# Patient Record
Sex: FEMALE | Race: WHITE | NOT HISPANIC OR LATINO | Employment: FULL TIME | URBAN - METROPOLITAN AREA
[De-identification: names, ages, dates, MRNs, and addresses within clinical notes are randomized per-mention and may not be internally consistent; named-entity substitution may affect disease eponyms.]

---

## 2023-03-03 LAB
ALANINE AMINOTRANSFERASE (SGPT) (U/L) IN SER/PLAS: 38 U/L (ref 7–45)
ALBUMIN (G/DL) IN SER/PLAS: 4.1 G/DL (ref 3.4–5)
ALKALINE PHOSPHATASE (U/L) IN SER/PLAS: 129 U/L (ref 33–136)
ANION GAP IN SER/PLAS: 15 MMOL/L (ref 10–20)
ASPARTATE AMINOTRANSFERASE (SGOT) (U/L) IN SER/PLAS: 16 U/L (ref 9–39)
BASOPHILS (10*3/UL) IN BLOOD BY AUTOMATED COUNT: 0.12 X10E9/L (ref 0–0.1)
BASOPHILS/100 LEUKOCYTES IN BLOOD BY AUTOMATED COUNT: 1.1 % (ref 0–2)
BILIRUBIN TOTAL (MG/DL) IN SER/PLAS: 0.4 MG/DL (ref 0–1.2)
CALCIUM (MG/DL) IN SER/PLAS: 9.1 MG/DL (ref 8.6–10.3)
CARBON DIOXIDE, TOTAL (MMOL/L) IN SER/PLAS: 26 MMOL/L (ref 21–32)
CHLORIDE (MMOL/L) IN SER/PLAS: 102 MMOL/L (ref 98–107)
CREATININE (MG/DL) IN SER/PLAS: 0.87 MG/DL (ref 0.5–1.05)
EOSINOPHILS (10*3/UL) IN BLOOD BY AUTOMATED COUNT: 0.15 X10E9/L (ref 0–0.7)
EOSINOPHILS/100 LEUKOCYTES IN BLOOD BY AUTOMATED COUNT: 1.3 % (ref 0–6)
ERYTHROCYTE DISTRIBUTION WIDTH (RATIO) BY AUTOMATED COUNT: 14.5 % (ref 11.5–14.5)
ERYTHROCYTE MEAN CORPUSCULAR HEMOGLOBIN CONCENTRATION (G/DL) BY AUTOMATED: 31.3 G/DL (ref 32–36)
ERYTHROCYTE MEAN CORPUSCULAR VOLUME (FL) BY AUTOMATED COUNT: 94 FL (ref 80–100)
ERYTHROCYTES (10*6/UL) IN BLOOD BY AUTOMATED COUNT: 4.27 X10E12/L (ref 4–5.2)
GFR FEMALE: 75 ML/MIN/1.73M2
GLUCOSE (MG/DL) IN SER/PLAS: 92 MG/DL (ref 74–99)
HEMATOCRIT (%) IN BLOOD BY AUTOMATED COUNT: 40 % (ref 36–46)
HEMOGLOBIN (G/DL) IN BLOOD: 12.5 G/DL (ref 12–16)
IMMATURE GRANULOCYTES/100 LEUKOCYTES IN BLOOD BY AUTOMATED COUNT: 0.5 % (ref 0–0.9)
LEUKOCYTES (10*3/UL) IN BLOOD BY AUTOMATED COUNT: 11.2 X10E9/L (ref 4.4–11.3)
LIPASE (U/L) IN SER/PLAS: 95 U/L (ref 9–82)
LYMPHOCYTES (10*3/UL) IN BLOOD BY AUTOMATED COUNT: 2.55 X10E9/L (ref 1.2–4.8)
LYMPHOCYTES/100 LEUKOCYTES IN BLOOD BY AUTOMATED COUNT: 22.8 % (ref 13–44)
MONOCYTES (10*3/UL) IN BLOOD BY AUTOMATED COUNT: 1.04 X10E9/L (ref 0.1–1)
MONOCYTES/100 LEUKOCYTES IN BLOOD BY AUTOMATED COUNT: 9.3 % (ref 2–10)
NEUTROPHILS (10*3/UL) IN BLOOD BY AUTOMATED COUNT: 7.26 X10E9/L (ref 1.2–7.7)
NEUTROPHILS/100 LEUKOCYTES IN BLOOD BY AUTOMATED COUNT: 65 % (ref 40–80)
PLATELETS (10*3/UL) IN BLOOD AUTOMATED COUNT: 511 X10E9/L (ref 150–450)
POTASSIUM (MMOL/L) IN SER/PLAS: 4.1 MMOL/L (ref 3.5–5.3)
PROTEIN TOTAL: 7.9 G/DL (ref 6.4–8.2)
SODIUM (MMOL/L) IN SER/PLAS: 139 MMOL/L (ref 136–145)
UREA NITROGEN (MG/DL) IN SER/PLAS: 21 MG/DL (ref 6–23)

## 2023-03-04 ENCOUNTER — TELEPHONE (OUTPATIENT)
Dept: PRIMARY CARE | Facility: CLINIC | Age: 63
End: 2023-03-04
Payer: COMMERCIAL

## 2023-03-04 DIAGNOSIS — M79.2 NEUROPATHIC PAIN OF UPPER EXTREMITY: ICD-10-CM

## 2023-03-04 DIAGNOSIS — M77.8 TENDONITIS OF BOTH ELBOWS: ICD-10-CM

## 2023-03-04 DIAGNOSIS — R19.7 DIARRHEA, UNSPECIFIED TYPE: Primary | ICD-10-CM

## 2023-03-04 PROBLEM — R39.11 URINARY HESITANCY: Status: RESOLVED | Noted: 2023-03-04 | Resolved: 2023-03-04

## 2023-03-04 PROBLEM — E83.52 HYPERCALCEMIA: Status: RESOLVED | Noted: 2023-03-04 | Resolved: 2023-03-04

## 2023-03-04 PROBLEM — M10.9 GOUT ATTACK: Status: RESOLVED | Noted: 2023-03-04 | Resolved: 2023-03-04

## 2023-03-04 PROBLEM — E55.9 VITAMIN D DEFICIENCY: Status: ACTIVE | Noted: 2023-03-04

## 2023-03-04 PROBLEM — D50.9 IRON DEFICIENCY ANEMIA: Status: ACTIVE | Noted: 2023-03-04

## 2023-03-04 PROBLEM — N81.10 CYSTOCELE WITH RECTOCELE: Status: RESOLVED | Noted: 2023-03-04 | Resolved: 2023-03-04

## 2023-03-04 PROBLEM — M54.50 LOW BACK PAIN: Status: RESOLVED | Noted: 2023-03-04 | Resolved: 2023-03-04

## 2023-03-04 PROBLEM — N99.3 VAGINAL VAULT PROLAPSE, POSTHYSTERECTOMY: Status: RESOLVED | Noted: 2023-03-04 | Resolved: 2023-03-04

## 2023-03-04 PROBLEM — N81.89 PELVIC FLOOR WEAKNESS: Status: RESOLVED | Noted: 2023-03-04 | Resolved: 2023-03-04

## 2023-03-04 PROBLEM — D75.839 THROMBOCYTHEMIA: Status: ACTIVE | Noted: 2023-03-04

## 2023-03-04 PROBLEM — I82.622: Status: RESOLVED | Noted: 2023-03-04 | Resolved: 2023-03-04

## 2023-03-04 PROBLEM — F51.04 CHRONIC INSOMNIA: Status: ACTIVE | Noted: 2023-03-04

## 2023-03-04 PROBLEM — N95.2 POST-MENOPAUSE ATROPHIC VAGINITIS: Status: RESOLVED | Noted: 2023-03-04 | Resolved: 2023-03-04

## 2023-03-04 PROBLEM — N39.3 FEMALE STRESS INCONTINENCE: Status: ACTIVE | Noted: 2023-03-04

## 2023-03-04 PROBLEM — N81.6 CYSTOCELE WITH RECTOCELE: Status: RESOLVED | Noted: 2023-03-04 | Resolved: 2023-03-04

## 2023-03-04 PROBLEM — E03.9 HYPOTHYROIDISM, ADULT: Status: ACTIVE | Noted: 2023-03-04

## 2023-03-04 PROBLEM — K21.9 GERD (GASTROESOPHAGEAL REFLUX DISEASE): Status: ACTIVE | Noted: 2023-03-04

## 2023-03-04 PROBLEM — M25.50 POLYARTHRALGIA: Status: ACTIVE | Noted: 2023-03-04

## 2023-03-04 PROBLEM — M79.7 PRIMARY FIBROMYALGIA: Status: ACTIVE | Noted: 2023-03-04

## 2023-03-04 PROBLEM — M54.2 NECK PAIN: Status: RESOLVED | Noted: 2023-03-04 | Resolved: 2023-03-04

## 2023-03-04 PROBLEM — I48.91 ATRIAL FIBRILLATION WITH RVR (MULTI): Status: RESOLVED | Noted: 2023-03-04 | Resolved: 2023-03-04

## 2023-03-04 RX ORDER — ERGOCALCIFEROL 1.25 MG/1
1 CAPSULE ORAL
COMMUNITY
Start: 2020-03-20

## 2023-03-04 RX ORDER — PANTOPRAZOLE SODIUM 40 MG/1
40 TABLET, DELAYED RELEASE ORAL 2 TIMES DAILY
COMMUNITY
Start: 2022-07-21 | End: 2023-09-06 | Stop reason: SDUPTHER

## 2023-03-04 RX ORDER — ZOLPIDEM TARTRATE 10 MG/1
1 TABLET ORAL NIGHTLY PRN
COMMUNITY
End: 2023-06-27

## 2023-03-04 RX ORDER — LEVOTHYROXINE SODIUM 100 UG/1
1 TABLET ORAL DAILY
COMMUNITY
End: 2023-09-06 | Stop reason: SDUPTHER

## 2023-03-06 RX ORDER — PREDNISONE 10 MG/1
TABLET ORAL
Qty: 70 TABLET | Refills: 0 | Status: SHIPPED | OUTPATIENT
Start: 2023-03-06 | End: 2023-04-03

## 2023-03-06 NOTE — TELEPHONE ENCOUNTER
Spoke with patient. She is feeling much better over the past 2 days. Instructed to finish Abx for presumed pylonephritis.  Discussed referral to GI for chronic loose stool. Referral entered.  Discussed starting prednisone taper once she is off Abx, for the bilateral neuropathic arm pain. Rx sent to pharmacy.    Patient to call if symptoms start to worsen.  Virginia Factor, DO

## 2023-03-29 ENCOUNTER — TELEPHONE (OUTPATIENT)
Dept: PRIMARY CARE | Facility: CLINIC | Age: 63
End: 2023-03-29
Payer: COMMERCIAL

## 2023-03-29 DIAGNOSIS — F40.240 CLAUSTROPHOBIA: Primary | ICD-10-CM

## 2023-03-29 RX ORDER — LORAZEPAM 0.5 MG/1
.5-1 TABLET ORAL SEE ADMIN INSTRUCTIONS
Qty: 2 TABLET | Refills: 0 | Status: SHIPPED | OUTPATIENT
Start: 2023-03-29 | End: 2023-09-06 | Stop reason: SDUPTHER

## 2023-03-29 NOTE — TELEPHONE ENCOUNTER
Pt called in requesting, something to be called in. Pt is having MRI on Friday and she claustrophobic.     Pharmacy - [facility]

## 2023-04-27 LAB
THYROTROPIN (MIU/L) IN SER/PLAS BY DETECTION LIMIT <= 0.05 MIU/L: 1.35 MIU/L (ref 0.44–3.98)
TISSUE TRANSGLUTAMINASE, IGA: <1 U/ML (ref 0–14)

## 2023-04-28 LAB — C. DIFFICILE TOXIN, PCR: DETECTED

## 2023-05-01 LAB
CALPROTECTIN, STOOL: 145 UG/G
PANCREATIC ELASTASE, FECAL: >800 UG/G

## 2023-05-30 ENCOUNTER — HOSPITAL ENCOUNTER (OUTPATIENT)
Dept: DATA CONVERSION | Facility: HOSPITAL | Age: 63
End: 2023-05-30
Attending: INTERNAL MEDICINE | Admitting: INTERNAL MEDICINE
Payer: COMMERCIAL

## 2023-05-30 DIAGNOSIS — K57.30 DIVERTICULOSIS OF LARGE INTESTINE WITHOUT PERFORATION OR ABSCESS WITHOUT BLEEDING: ICD-10-CM

## 2023-05-30 DIAGNOSIS — F41.9 ANXIETY DISORDER, UNSPECIFIED: ICD-10-CM

## 2023-05-30 DIAGNOSIS — Z87.891 PERSONAL HISTORY OF NICOTINE DEPENDENCE: ICD-10-CM

## 2023-05-30 DIAGNOSIS — Z85.07 PERSONAL HISTORY OF MALIGNANT NEOPLASM OF PANCREAS: ICD-10-CM

## 2023-05-30 DIAGNOSIS — I25.2 OLD MYOCARDIAL INFARCTION: ICD-10-CM

## 2023-05-30 DIAGNOSIS — K44.9 DIAPHRAGMATIC HERNIA WITHOUT OBSTRUCTION OR GANGRENE: ICD-10-CM

## 2023-05-30 DIAGNOSIS — D12.0 BENIGN NEOPLASM OF CECUM: ICD-10-CM

## 2023-05-30 DIAGNOSIS — I48.20 CHRONIC ATRIAL FIBRILLATION, UNSPECIFIED (MULTI): ICD-10-CM

## 2023-05-30 DIAGNOSIS — M79.7 FIBROMYALGIA: ICD-10-CM

## 2023-05-30 DIAGNOSIS — Z86.718 PERSONAL HISTORY OF OTHER VENOUS THROMBOSIS AND EMBOLISM: ICD-10-CM

## 2023-05-30 DIAGNOSIS — K64.8 OTHER HEMORRHOIDS: ICD-10-CM

## 2023-05-30 DIAGNOSIS — K21.9 GASTRO-ESOPHAGEAL REFLUX DISEASE WITHOUT ESOPHAGITIS: ICD-10-CM

## 2023-05-30 DIAGNOSIS — R19.7 DIARRHEA, UNSPECIFIED: ICD-10-CM

## 2023-05-30 DIAGNOSIS — E03.9 HYPOTHYROIDISM, UNSPECIFIED: ICD-10-CM

## 2023-06-01 LAB
COMPLETE PATHOLOGY REPORT: NORMAL
CONVERTED CLINICAL DIAGNOSIS-HISTORY: NORMAL
CONVERTED FINAL DIAGNOSIS: NORMAL
CONVERTED FINAL REPORT PDF LINK TO COPY AND PASTE: NORMAL
CONVERTED GROSS DESCRIPTION: NORMAL

## 2023-06-21 DIAGNOSIS — F51.04 PSYCHOPHYSIOLOGIC INSOMNIA: ICD-10-CM

## 2023-06-22 LAB — C. DIFFICILE TOXIN, PCR: DETECTED

## 2023-06-27 RX ORDER — FERROUS SULFATE TAB 325 MG (65 MG ELEMENTAL FE) 325 (65 FE) MG
TAB ORAL
COMMUNITY
Start: 2022-12-26 | End: 2023-09-06 | Stop reason: WASHOUT

## 2023-06-27 RX ORDER — METOPROLOL TARTRATE 50 MG/1
50 TABLET ORAL 2 TIMES DAILY
COMMUNITY
Start: 2022-12-26 | End: 2023-09-06 | Stop reason: WASHOUT

## 2023-06-27 RX ORDER — ALBUTEROL SULFATE 90 UG/1
AEROSOL, METERED RESPIRATORY (INHALATION)
COMMUNITY
Start: 2023-05-09

## 2023-06-27 RX ORDER — PREDNISONE 50 MG/1
50 TABLET ORAL DAILY
COMMUNITY
Start: 2022-09-08 | End: 2023-09-06 | Stop reason: ALTCHOICE

## 2023-06-27 RX ORDER — ACETAMINOPHEN AND CODEINE PHOSPHATE 300; 60 MG/1; MG/1
TABLET ORAL
COMMUNITY
Start: 2022-08-26 | End: 2023-09-06 | Stop reason: ALTCHOICE

## 2023-06-27 RX ORDER — APIXABAN 5 MG/1
1 TABLET, FILM COATED ORAL 2 TIMES DAILY
COMMUNITY
Start: 2022-12-26 | End: 2023-09-01

## 2023-06-27 RX ORDER — FAMOTIDINE 20 MG/1
TABLET, FILM COATED ORAL
COMMUNITY
Start: 2023-04-27

## 2023-06-27 RX ORDER — DICYCLOMINE HYDROCHLORIDE 20 MG/1
TABLET ORAL
COMMUNITY
Start: 2023-02-26 | End: 2023-09-06 | Stop reason: WASHOUT

## 2023-06-27 RX ORDER — HYDROCODONE BITARTRATE AND ACETAMINOPHEN 5; 325 MG/1; MG/1
TABLET ORAL
COMMUNITY
Start: 2023-03-03 | End: 2023-09-06 | Stop reason: WASHOUT

## 2023-06-27 RX ORDER — BENZONATATE 200 MG/1
1 CAPSULE ORAL
COMMUNITY
Start: 2022-09-21 | End: 2023-09-06 | Stop reason: WASHOUT

## 2023-06-27 RX ORDER — ZOLPIDEM TARTRATE 10 MG/1
TABLET ORAL
Qty: 90 TABLET | Refills: 0 | Status: SHIPPED | OUTPATIENT
Start: 2023-06-27 | End: 2023-09-06 | Stop reason: SDUPTHER

## 2023-06-27 RX ORDER — METOPROLOL TARTRATE 25 MG/1
12.5 TABLET, FILM COATED ORAL 2 TIMES DAILY
COMMUNITY
Start: 2023-01-30 | End: 2023-09-06 | Stop reason: WASHOUT

## 2023-09-01 PROBLEM — R19.7 DIARRHEA: Status: ACTIVE | Noted: 2023-09-01

## 2023-09-01 PROBLEM — M62.838 MUSCLE SPASM: Status: ACTIVE | Noted: 2023-09-01

## 2023-09-01 PROBLEM — M54.2 NECK PAIN: Status: ACTIVE | Noted: 2023-09-01

## 2023-09-01 PROBLEM — R10.9 ABDOMINAL PAIN OF MULTIPLE SITES: Status: ACTIVE | Noted: 2023-09-01

## 2023-09-01 PROBLEM — F41.1 GENERALIZED ANXIETY DISORDER: Status: ACTIVE | Noted: 2023-09-01

## 2023-09-01 PROBLEM — D64.9 ANEMIA: Status: ACTIVE | Noted: 2023-09-01

## 2023-09-01 PROBLEM — R10.31 ABDOMINAL PAIN, RLQ (RIGHT LOWER QUADRANT): Status: ACTIVE | Noted: 2023-09-01

## 2023-09-01 PROBLEM — M54.50 LOW BACK PAIN: Status: ACTIVE | Noted: 2023-09-01

## 2023-09-01 PROBLEM — R10.9 FLANK PAIN: Status: ACTIVE | Noted: 2023-09-01

## 2023-09-01 PROBLEM — N81.10 CYSTOCELE WITH RECTOCELE: Status: ACTIVE | Noted: 2023-09-01

## 2023-09-01 PROBLEM — A04.72 CLOSTRIDIUM DIFFICILE DIARRHEA: Status: ACTIVE | Noted: 2023-09-01

## 2023-09-01 PROBLEM — R10.32 ABDOMINAL PAIN, LLQ (LEFT LOWER QUADRANT): Status: ACTIVE | Noted: 2023-09-01

## 2023-09-01 PROBLEM — N39.0 ACUTE UTI: Status: ACTIVE | Noted: 2023-09-01

## 2023-09-01 PROBLEM — E83.52 HYPERCALCEMIA: Status: ACTIVE | Noted: 2023-09-01

## 2023-09-01 PROBLEM — K86.9 PANCREATIC LESION (HHS-HCC): Status: ACTIVE | Noted: 2023-09-01

## 2023-09-01 PROBLEM — M79.671 RIGHT FOOT PAIN: Status: ACTIVE | Noted: 2023-09-01

## 2023-09-01 PROBLEM — M77.9 TENDONITIS: Status: ACTIVE | Noted: 2023-09-01

## 2023-09-01 PROBLEM — I48.91 ATRIAL FIBRILLATION WITH RVR (MULTI): Status: ACTIVE | Noted: 2023-09-01

## 2023-09-01 PROBLEM — I82.622: Status: ACTIVE | Noted: 2023-09-01

## 2023-09-01 PROBLEM — G25.81 RLS (RESTLESS LEGS SYNDROME): Status: ACTIVE | Noted: 2023-09-01

## 2023-09-01 PROBLEM — M10.9 GOUT ATTACK: Status: ACTIVE | Noted: 2023-09-01

## 2023-09-01 PROBLEM — R10.2 FEMALE PELVIC PAIN: Status: ACTIVE | Noted: 2023-09-01

## 2023-09-01 PROBLEM — R39.11 URINARY HESITANCY: Status: ACTIVE | Noted: 2023-09-01

## 2023-09-01 PROBLEM — N81.6 CYSTOCELE WITH RECTOCELE: Status: ACTIVE | Noted: 2023-09-01

## 2023-09-01 PROBLEM — K59.00 CONSTIPATION: Status: ACTIVE | Noted: 2023-09-01

## 2023-09-01 PROBLEM — N99.3 VAGINAL VAULT PROLAPSE, POSTHYSTERECTOMY: Status: ACTIVE | Noted: 2023-09-01

## 2023-09-01 PROBLEM — N28.9 RENAL LESION: Status: ACTIVE | Noted: 2023-09-01

## 2023-09-01 PROBLEM — R10.12 CHRONIC LUQ PAIN: Status: ACTIVE | Noted: 2023-09-01

## 2023-09-01 PROBLEM — J06.9 VIRAL URI WITH COUGH: Status: ACTIVE | Noted: 2023-09-01

## 2023-09-01 PROBLEM — G89.29 CHRONIC LUQ PAIN: Status: ACTIVE | Noted: 2023-09-01

## 2023-09-01 PROBLEM — R32 URINARY INCONTINENCE: Status: ACTIVE | Noted: 2023-09-01

## 2023-09-01 PROBLEM — M50.20 HERNIATED DISC, CERVICAL: Status: ACTIVE | Noted: 2023-09-01

## 2023-09-01 PROBLEM — M79.603 UPPER EXTREMITY PAIN: Status: ACTIVE | Noted: 2023-09-01

## 2023-09-01 PROBLEM — N81.89 PELVIC FLOOR WEAKNESS: Status: ACTIVE | Noted: 2023-09-01

## 2023-09-01 PROBLEM — N95.2 POST-MENOPAUSE ATROPHIC VAGINITIS: Status: ACTIVE | Noted: 2023-09-01

## 2023-09-06 ENCOUNTER — OFFICE VISIT (OUTPATIENT)
Dept: PRIMARY CARE | Facility: CLINIC | Age: 63
End: 2023-09-06
Payer: COMMERCIAL

## 2023-09-06 VITALS
SYSTOLIC BLOOD PRESSURE: 132 MMHG | TEMPERATURE: 97.3 F | OXYGEN SATURATION: 98 % | HEIGHT: 67 IN | HEART RATE: 76 BPM | BODY MASS INDEX: 31.39 KG/M2 | WEIGHT: 200 LBS | RESPIRATION RATE: 18 BRPM | DIASTOLIC BLOOD PRESSURE: 82 MMHG

## 2023-09-06 DIAGNOSIS — F41.1 GENERALIZED ANXIETY DISORDER WITH PANIC ATTACKS: Primary | ICD-10-CM

## 2023-09-06 DIAGNOSIS — M79.602 PAIN IN BOTH UPPER EXTREMITIES: ICD-10-CM

## 2023-09-06 DIAGNOSIS — F41.0 GENERALIZED ANXIETY DISORDER WITH PANIC ATTACKS: Primary | ICD-10-CM

## 2023-09-06 DIAGNOSIS — K21.9 GASTROESOPHAGEAL REFLUX DISEASE WITHOUT ESOPHAGITIS: ICD-10-CM

## 2023-09-06 DIAGNOSIS — F51.04 CHRONIC INSOMNIA: ICD-10-CM

## 2023-09-06 DIAGNOSIS — E03.9 HYPOTHYROIDISM, ADULT: ICD-10-CM

## 2023-09-06 DIAGNOSIS — R19.5 LOOSE STOOLS: ICD-10-CM

## 2023-09-06 DIAGNOSIS — M79.601 PAIN IN BOTH UPPER EXTREMITIES: ICD-10-CM

## 2023-09-06 PROBLEM — G43.909 MIGRAINE: Status: RESOLVED | Noted: 2022-08-10 | Resolved: 2023-09-06

## 2023-09-06 PROBLEM — F34.1 DYSTHYMIC DISORDER: Status: ACTIVE | Noted: 2022-08-10

## 2023-09-06 PROBLEM — R10.9 ABDOMINAL PAIN OF MULTIPLE SITES: Status: RESOLVED | Noted: 2023-09-01 | Resolved: 2023-09-06

## 2023-09-06 PROBLEM — M62.838 MUSCLE SPASM: Status: RESOLVED | Noted: 2023-09-01 | Resolved: 2023-09-06

## 2023-09-06 PROBLEM — I48.3 TYPICAL ATRIAL FLUTTER (MULTI): Status: ACTIVE | Noted: 2022-08-10

## 2023-09-06 PROBLEM — M77.9 TENDONITIS: Status: RESOLVED | Noted: 2023-09-01 | Resolved: 2023-09-06

## 2023-09-06 PROBLEM — D64.9 ANEMIA: Status: RESOLVED | Noted: 2023-09-01 | Resolved: 2023-09-06

## 2023-09-06 PROBLEM — Q21.11 OSTIUM SECUNDUM TYPE ATRIAL SEPTAL DEFECT (HHS-HCC): Status: ACTIVE | Noted: 2022-08-10

## 2023-09-06 PROBLEM — K59.00 CONSTIPATION: Status: RESOLVED | Noted: 2023-09-01 | Resolved: 2023-09-06

## 2023-09-06 PROBLEM — R10.9 FLANK PAIN: Status: RESOLVED | Noted: 2023-09-01 | Resolved: 2023-09-06

## 2023-09-06 PROBLEM — M10.9 GOUT ATTACK: Status: RESOLVED | Noted: 2023-09-01 | Resolved: 2023-09-06

## 2023-09-06 PROBLEM — R10.32 ABDOMINAL PAIN, LLQ (LEFT LOWER QUADRANT): Status: RESOLVED | Noted: 2023-09-01 | Resolved: 2023-09-06

## 2023-09-06 PROBLEM — K86.9 PANCREATIC LESION (HHS-HCC): Status: RESOLVED | Noted: 2023-09-01 | Resolved: 2023-09-06

## 2023-09-06 PROBLEM — M79.671 RIGHT FOOT PAIN: Status: RESOLVED | Noted: 2023-09-01 | Resolved: 2023-09-06

## 2023-09-06 PROBLEM — D50.9 IRON DEFICIENCY ANEMIA: Status: RESOLVED | Noted: 2023-03-04 | Resolved: 2023-09-06

## 2023-09-06 PROBLEM — N39.0 ACUTE UTI: Status: RESOLVED | Noted: 2023-09-01 | Resolved: 2023-09-06

## 2023-09-06 PROCEDURE — 1036F TOBACCO NON-USER: CPT | Performed by: FAMILY MEDICINE

## 2023-09-06 PROCEDURE — 99214 OFFICE O/P EST MOD 30 MIN: CPT | Performed by: FAMILY MEDICINE

## 2023-09-06 RX ORDER — ZOLPIDEM TARTRATE 10 MG/1
TABLET ORAL
Qty: 90 TABLET | Refills: 0 | Status: SHIPPED | OUTPATIENT
Start: 2023-09-06 | End: 2023-11-20 | Stop reason: SDUPTHER

## 2023-09-06 RX ORDER — PREDNISONE 10 MG/1
TABLET ORAL
Qty: 70 TABLET | Refills: 0 | Status: SHIPPED | OUTPATIENT
Start: 2023-09-06 | End: 2023-10-04

## 2023-09-06 RX ORDER — LEVOTHYROXINE SODIUM 100 UG/1
100 TABLET ORAL DAILY
Qty: 90 TABLET | Refills: 3 | Status: SHIPPED | OUTPATIENT
Start: 2023-09-06 | End: 2024-01-15 | Stop reason: SDUPTHER

## 2023-09-06 RX ORDER — LORAZEPAM 0.5 MG/1
0.5 TABLET ORAL DAILY PRN
Qty: 14 TABLET | Refills: 0 | Status: SHIPPED | OUTPATIENT
Start: 2023-09-06 | End: 2023-09-26 | Stop reason: SDUPTHER

## 2023-09-06 RX ORDER — BUSPIRONE HYDROCHLORIDE 10 MG/1
10 TABLET ORAL DAILY
Qty: 90 TABLET | Refills: 1 | Status: SHIPPED | OUTPATIENT
Start: 2023-09-06 | End: 2023-09-26 | Stop reason: SDUPTHER

## 2023-09-06 RX ORDER — PANTOPRAZOLE SODIUM 40 MG/1
40 TABLET, DELAYED RELEASE ORAL 2 TIMES DAILY
Qty: 180 TABLET | Refills: 3 | Status: SHIPPED | OUTPATIENT
Start: 2023-09-06 | End: 2024-01-15 | Stop reason: SDUPTHER

## 2023-09-06 SDOH — ECONOMIC STABILITY: FOOD INSECURITY: WITHIN THE PAST 12 MONTHS, THE FOOD YOU BOUGHT JUST DIDN'T LAST AND YOU DIDN'T HAVE MONEY TO GET MORE.: NEVER TRUE

## 2023-09-06 SDOH — ECONOMIC STABILITY: FOOD INSECURITY: WITHIN THE PAST 12 MONTHS, YOU WORRIED THAT YOUR FOOD WOULD RUN OUT BEFORE YOU GOT MONEY TO BUY MORE.: NEVER TRUE

## 2023-09-06 ASSESSMENT — PATIENT HEALTH QUESTIONNAIRE - PHQ9
2. FEELING DOWN, DEPRESSED OR HOPELESS: SEVERAL DAYS
10. IF YOU CHECKED OFF ANY PROBLEMS, HOW DIFFICULT HAVE THESE PROBLEMS MADE IT FOR YOU TO DO YOUR WORK, TAKE CARE OF THINGS AT HOME, OR GET ALONG WITH OTHER PEOPLE: SOMEWHAT DIFFICULT
1. LITTLE INTEREST OR PLEASURE IN DOING THINGS: SEVERAL DAYS
SUM OF ALL RESPONSES TO PHQ9 QUESTIONS 1 & 2: 2

## 2023-09-06 ASSESSMENT — LIFESTYLE VARIABLES
SKIP TO QUESTIONS 9-10: 1
HOW OFTEN DO YOU HAVE A DRINK CONTAINING ALCOHOL: NEVER
AUDIT-C TOTAL SCORE: 0
HOW OFTEN DO YOU HAVE SIX OR MORE DRINKS ON ONE OCCASION: NEVER
HOW MANY STANDARD DRINKS CONTAINING ALCOHOL DO YOU HAVE ON A TYPICAL DAY: PATIENT DOES NOT DRINK

## 2023-09-06 ASSESSMENT — PAIN SCALES - GENERAL: PAINLEVEL: 3

## 2023-09-06 NOTE — PATIENT INSTRUCTIONS
Thank you for choosing [facility] for your healthcare.   As always if you have any questions or concerns please do not hesitate to call our office at [phone] or through [email or web address].    Have a great day!  Victoria Epstein, DO

## 2023-09-06 NOTE — ASSESSMENT & PLAN NOTE
Start Buspar 10 mg in the morning  Can try lorazepam once a day prn when feeling overwhelmed or experiencing abdominal pain due to anxiety to see if it helps

## 2023-09-06 NOTE — PROGRESS NOTES
Subjective   Patient ID: Kelsi Montejo is a 62 y.o. female who presents for Follow-up (Wants to discuss lab results with doctor. Would like Rx of prednisone.) and Depression (Would like to discuss this with Dr. Epstein).  Insomnia  She has a history of chronic insomnia and is on Ambien at bedtime.     She is still having cognitive issues since having COVID. She has difficulty learning new information and gets overwhelmed if routines/processes at work changes. She has also noticed that she gets upset easily, irritable and angers easily also. She has intermittent abdominal pain which GI believes may be related to mood/anxiety.     She recently finished a course of vancomycin for C. Diff colitis. She is still having loose stools.      pain joint pain and fibromyalgia   She has a history of fibromyalgia and polyarthralgia. She was seeing Dr. Garcias until he left his practice a few years ago. She is concerned about bilateral arm pain (particularly around elbows) and hand pain. Ortho though it might be tennis elbow and referred her for PT but they felt that her pain was too widespread to be epicondylitis. At her last visit, I treated her with a long and slow steroid taper which mostly resolved the bilateral arm pain for a time. The pain has returned and she would like another round of prednisone.           Current Outpatient Medications   Medication Sig Dispense Refill    albuterol 90 mcg/actuation inhaler INHALE 2 PUFFS into the lungs EVERY 6 HOURS AS NEEDED for SHORTNESS OF BREATH, coughing, or wheezing      ergocalciferol (Vitamin D-2) 1.25 MG (40855 UT) capsule Take 1 capsule (1,250 mcg) by mouth 1 (one) time per week.      famotidine (Pepcid) 20 mg tablet Take by mouth once daily.      busPIRone (Buspar) 10 mg tablet Take 1 tablet (10 mg) by mouth once daily. In the morning 90 tablet 1    levothyroxine (Synthroid, Levoxyl) 100 mcg tablet Take 1 tablet (100 mcg) by mouth once daily. 90 tablet 3    LORazepam (Ativan)  0.5 mg tablet Take 1 tablet (0.5 mg) by mouth once daily as needed for anxiety for up to 14 days. Take 30 minutes prior to procedure. 14 tablet 0    pantoprazole (ProtoNix) 40 mg EC tablet Take 1 tablet (40 mg) by mouth 2 times a day. 180 tablet 3    predniSONE (Deltasone) 10 mg tablet Take 4 tablets (40 mg) by mouth once daily for 7 days, THEN 3 tablets (30 mg) once daily for 7 days, THEN 2 tablets (20 mg) once daily for 7 days, THEN 1 tablet (10 mg) once daily for 7 days. 70 tablet 0    zolpidem (Ambien) 10 mg tablet TAKE 1 TABLET BY MOUTH AT BEDTIME AS NEEDED 90 tablet 0     No current facility-administered medications for this visit.     OARRS:  Victoria Epstein DO on 9/6/2023 11:25 AM  I have personally reviewed the OARRS report for Kelsi Montejo. I have considered the risks of abuse, dependence, addiction and diversion and I believe that it is clinically appropriate for Kelsi Montejo to be prescribed this medication    Is the patient prescribed a combination of a benzodiazepine and opioid?  No    Last Urine Drug Screen / ordered today: No  Recent Results (from the past 8760 hour(s))   Drug Screen, Urine With Reflex to Confirmation    Collection Time: 11/15/22  3:17 PM   Result Value Ref Range    DRUG SCREEN COMMENT URINE SEE BELOW     Amphetamine Screen, Urine PRESUMPTIVE NEGATIVE NEGATIVE    Barbiturate Screen, Urine PRESUMPTIVE NEGATIVE NEGATIVE    BENZODIAZEPINE (PRESENCE) IN URINE BY SCREEN METHOD PRESUMPTIVE NEGATIVE NEGATIVE    Cannabinoid Screen, Urine PRESUMPTIVE NEGATIVE NEGATIVE    Cocaine Screen, Urine PRESUMPTIVE NEGATIVE NEGATIVE    Fentanyl, Ur PRESUMPTIVE NEGATIVE NEGATIVE    Methadone Screen, Urine PRESUMPTIVE NEGATIVE NEGATIVE    Opiate Screen, Urine PRESUMPTIVE NEGATIVE NEGATIVE    Oxycodone Screen, Ur PRESUMPTIVE NEGATIVE NEGATIVE    PCP Screen, Urine PRESUMPTIVE NEGATIVE NEGATIVE     Results are as expected.     Controlled Substance Agreement:  Date of the Last Agreement:  "today  Reviewed Controlled Substance Agreement including but not limited to the benefits, risks, and alternatives to treatment with a Controlled Substance medication(s).    Sleep Aids:   What is the patient's goal of therapy? Better sleep quantity and quality  Is this being achieved with current treatment? yes    Activities of Daily Living:   Is your overall impression that this patient is benefiting (symptom reduction outweighs side effects) from sleep aid therapy? Yes     1. Physical Functioning: Same  2. Family Relationship: Same  3. Social Relationship: Same  4. Mood: Same  5. Sleep Patterns: Same  6. Overall Function: Same      Objective     Visit Vitals  /82 (BP Location: Left arm, Patient Position: Sitting, BP Cuff Size: Large adult)   Pulse 76   Temp 36.3 °C (97.3 °F)   Resp 18   Ht 1.689 m (5' 6.5\")   Wt 90.7 kg (200 lb)   SpO2 98%   BMI 31.80 kg/m²   Smoking Status Former   BSA 2.06 m²        Constitutional: Well nourished, well developed, appears stated age  Eyes: no scleral icterus, no conjunctival injection  Ears: normal external auditory canal, no retraction or bulging of tympanic membranes  Neck: no thyromegaly  Cardiovascular: regular rate and rhythm, no leg edema  Respiratory: normal respiratory effort, clear to auscultation bilaterally  Musculoskeletal: No gross deformities appreciated  Skin: Warm, dry. No rashes  Neurologic: Alert, CNs II-XII grossly intact..  Psych: Appropriate mood and affect.      Left flank pain and LUQ pain   - flank pain has resolved   - LUQ pain has continued, waxes and wanes but is consistent   - xray abd ordered   - recheck lipase     Acute UTI   - stop cefpodoxime due to culture showed resistance to cephalosporins   - start Bactrim DS instead based on urine culture results      Iron deficiency anemia and RLS   - was unable to tolerate oral iron, caused vomiting.      Bilateral arm and hand pain   - inflammatory?   - resolved for a time, returned recently after " having IVs in ER   - plan on another prednisone taper if problem continues      Chronic insomnia  - stable on Ambien for years  - UDS on file 11/15/22 CSA on file 4/28/22     Left renal lesion on CT    - MRI ordered per radiology recommendation     Follow up 3 months.   Victoria Epstein DO       Assessment/Plan   Problem List Items Addressed This Visit       GERD (gastroesophageal reflux disease)    Relevant Medications    pantoprazole (ProtoNix) 40 mg EC tablet    Chronic insomnia     CSA updated today  UDS on file 11/2022         Relevant Medications    zolpidem (Ambien) 10 mg tablet    Hypothyroidism, adult    Relevant Medications    levothyroxine (Synthroid, Levoxyl) 100 mcg tablet    Generalized anxiety disorder with panic attacks - Primary     Start Buspar 10 mg in the morning  Can try lorazepam once a day prn when feeling overwhelmed or experiencing abdominal pain due to anxiety to see if it helps         Relevant Medications    busPIRone (Buspar) 10 mg tablet    LORazepam (Ativan) 0.5 mg tablet    Upper extremity pain    Relevant Medications    predniSONE (Deltasone) 10 mg tablet     Other Visit Diagnoses       Loose stools        Relevant Orders    C. difficile, PCR            All pertinent lab work and results were reviewed with patient.     Follow up 4 months.    Victoria Epstein DO

## 2023-09-07 VITALS — WEIGHT: 191.8 LBS | HEIGHT: 65 IN | BODY MASS INDEX: 31.96 KG/M2

## 2023-09-25 ENCOUNTER — PATIENT MESSAGE (OUTPATIENT)
Dept: PRIMARY CARE | Facility: CLINIC | Age: 63
End: 2023-09-25
Payer: COMMERCIAL

## 2023-09-25 DIAGNOSIS — F41.0 GENERALIZED ANXIETY DISORDER WITH PANIC ATTACKS: ICD-10-CM

## 2023-09-25 DIAGNOSIS — F41.1 GENERALIZED ANXIETY DISORDER WITH PANIC ATTACKS: ICD-10-CM

## 2023-09-25 DIAGNOSIS — G25.81 RLS (RESTLESS LEGS SYNDROME): Primary | ICD-10-CM

## 2023-09-26 RX ORDER — GABAPENTIN 300 MG/1
300 CAPSULE ORAL NIGHTLY
Qty: 30 CAPSULE | Refills: 2 | Status: SHIPPED | OUTPATIENT
Start: 2023-09-26 | End: 2024-01-15 | Stop reason: SDUPTHER

## 2023-09-26 RX ORDER — LORAZEPAM 0.5 MG/1
0.5 TABLET ORAL DAILY PRN
Qty: 14 TABLET | Refills: 0 | Status: SHIPPED | OUTPATIENT
Start: 2023-09-26 | End: 2023-10-26 | Stop reason: SDUPTHER

## 2023-09-26 RX ORDER — BUSPIRONE HYDROCHLORIDE 10 MG/1
15 TABLET ORAL 2 TIMES DAILY
Qty: 60 TABLET | Refills: 2 | Status: SHIPPED | OUTPATIENT
Start: 2023-09-26 | End: 2023-10-23 | Stop reason: SDUPTHER

## 2023-10-23 DIAGNOSIS — F41.1 GENERALIZED ANXIETY DISORDER WITH PANIC ATTACKS: ICD-10-CM

## 2023-10-23 DIAGNOSIS — F41.0 GENERALIZED ANXIETY DISORDER WITH PANIC ATTACKS: ICD-10-CM

## 2023-10-23 RX ORDER — BUSPIRONE HYDROCHLORIDE 10 MG/1
15 TABLET ORAL 2 TIMES DAILY
Qty: 270 TABLET | Refills: 3 | Status: SHIPPED | OUTPATIENT
Start: 2023-10-23 | End: 2024-01-15 | Stop reason: SINTOL

## 2023-10-25 ENCOUNTER — PATIENT MESSAGE (OUTPATIENT)
Dept: PRIMARY CARE | Facility: CLINIC | Age: 63
End: 2023-10-25
Payer: COMMERCIAL

## 2023-10-25 DIAGNOSIS — F41.0 GENERALIZED ANXIETY DISORDER WITH PANIC ATTACKS: ICD-10-CM

## 2023-10-25 DIAGNOSIS — F41.1 GENERALIZED ANXIETY DISORDER WITH PANIC ATTACKS: ICD-10-CM

## 2023-10-25 DIAGNOSIS — M79.601 PAIN IN BOTH UPPER EXTREMITIES: Primary | ICD-10-CM

## 2023-10-25 DIAGNOSIS — M79.602 PAIN IN BOTH UPPER EXTREMITIES: Primary | ICD-10-CM

## 2023-10-26 RX ORDER — LORAZEPAM 0.5 MG/1
0.5 TABLET ORAL DAILY PRN
Qty: 14 TABLET | Refills: 0 | Status: SHIPPED | OUTPATIENT
Start: 2023-10-26 | End: 2023-11-20 | Stop reason: SDUPTHER

## 2023-10-26 RX ORDER — PREDNISONE 10 MG/1
TABLET ORAL
Qty: 70 TABLET | Refills: 0 | Status: SHIPPED | OUTPATIENT
Start: 2023-10-26 | End: 2024-01-15 | Stop reason: SDUPTHER

## 2023-11-20 ENCOUNTER — OFFICE VISIT (OUTPATIENT)
Dept: PRIMARY CARE | Facility: CLINIC | Age: 63
End: 2023-11-20
Payer: COMMERCIAL

## 2023-11-20 VITALS
BODY MASS INDEX: 31.64 KG/M2 | HEART RATE: 74 BPM | RESPIRATION RATE: 16 BRPM | OXYGEN SATURATION: 99 % | DIASTOLIC BLOOD PRESSURE: 64 MMHG | WEIGHT: 199 LBS | TEMPERATURE: 96.6 F | SYSTOLIC BLOOD PRESSURE: 106 MMHG

## 2023-11-20 DIAGNOSIS — E03.9 HYPOTHYROIDISM, ADULT: ICD-10-CM

## 2023-11-20 DIAGNOSIS — Z13.1 SCREENING FOR DIABETES MELLITUS: ICD-10-CM

## 2023-11-20 DIAGNOSIS — Z79.899 MEDICATION MANAGEMENT: ICD-10-CM

## 2023-11-20 DIAGNOSIS — F41.0 GENERALIZED ANXIETY DISORDER WITH PANIC ATTACKS: ICD-10-CM

## 2023-11-20 DIAGNOSIS — E83.52 HYPERCALCEMIA: ICD-10-CM

## 2023-11-20 DIAGNOSIS — F41.1 GENERALIZED ANXIETY DISORDER WITH PANIC ATTACKS: ICD-10-CM

## 2023-11-20 DIAGNOSIS — M25.50 POLYARTHRALGIA: ICD-10-CM

## 2023-11-20 DIAGNOSIS — U09.9 COVID-19 LONG HAULER MANIFESTING CHRONIC CONCENTRATION DEFICIT: ICD-10-CM

## 2023-11-20 DIAGNOSIS — R41.840 COVID-19 LONG HAULER MANIFESTING CHRONIC CONCENTRATION DEFICIT: ICD-10-CM

## 2023-11-20 DIAGNOSIS — D75.839 THROMBOCYTHEMIA: ICD-10-CM

## 2023-11-20 DIAGNOSIS — F51.04 CHRONIC INSOMNIA: Primary | ICD-10-CM

## 2023-11-20 PROBLEM — A04.72 CLOSTRIDIUM DIFFICILE DIARRHEA: Status: RESOLVED | Noted: 2023-09-01 | Resolved: 2023-11-20

## 2023-11-20 PROBLEM — J06.9 VIRAL URI WITH COUGH: Status: RESOLVED | Noted: 2023-09-01 | Resolved: 2023-11-20

## 2023-11-20 PROCEDURE — 1036F TOBACCO NON-USER: CPT | Performed by: FAMILY MEDICINE

## 2023-11-20 PROCEDURE — 99214 OFFICE O/P EST MOD 30 MIN: CPT | Performed by: FAMILY MEDICINE

## 2023-11-20 RX ORDER — ZOLPIDEM TARTRATE 10 MG/1
TABLET ORAL
Qty: 90 TABLET | Refills: 0 | Status: SHIPPED | OUTPATIENT
Start: 2023-11-29 | End: 2024-03-13

## 2023-11-20 RX ORDER — HYDROXYCHLOROQUINE SULFATE 200 MG/1
200 TABLET, FILM COATED ORAL DAILY
Qty: 90 TABLET | Refills: 1 | Status: SHIPPED | OUTPATIENT
Start: 2023-11-20 | End: 2023-11-30 | Stop reason: SINTOL

## 2023-11-20 RX ORDER — LORAZEPAM 0.5 MG/1
0.5 TABLET ORAL DAILY PRN
Qty: 14 TABLET | Refills: 0 | Status: SHIPPED | OUTPATIENT
Start: 2023-11-29 | End: 2024-01-24

## 2023-11-20 SDOH — ECONOMIC STABILITY: FOOD INSECURITY: WITHIN THE PAST 12 MONTHS, YOU WORRIED THAT YOUR FOOD WOULD RUN OUT BEFORE YOU GOT MONEY TO BUY MORE.: NEVER TRUE

## 2023-11-20 SDOH — ECONOMIC STABILITY: FOOD INSECURITY: WITHIN THE PAST 12 MONTHS, THE FOOD YOU BOUGHT JUST DIDN'T LAST AND YOU DIDN'T HAVE MONEY TO GET MORE.: NEVER TRUE

## 2023-11-20 ASSESSMENT — LIFESTYLE VARIABLES
SKIP TO QUESTIONS 9-10: 1
AUDIT-C TOTAL SCORE: 1
HOW MANY STANDARD DRINKS CONTAINING ALCOHOL DO YOU HAVE ON A TYPICAL DAY: 1 OR 2
HOW OFTEN DO YOU HAVE A DRINK CONTAINING ALCOHOL: MONTHLY OR LESS
HOW OFTEN DO YOU HAVE SIX OR MORE DRINKS ON ONE OCCASION: NEVER

## 2023-11-20 ASSESSMENT — PATIENT HEALTH QUESTIONNAIRE - PHQ9
10. IF YOU CHECKED OFF ANY PROBLEMS, HOW DIFFICULT HAVE THESE PROBLEMS MADE IT FOR YOU TO DO YOUR WORK, TAKE CARE OF THINGS AT HOME, OR GET ALONG WITH OTHER PEOPLE: NOT DIFFICULT AT ALL
2. FEELING DOWN, DEPRESSED OR HOPELESS: SEVERAL DAYS
1. LITTLE INTEREST OR PLEASURE IN DOING THINGS: SEVERAL DAYS
SUM OF ALL RESPONSES TO PHQ9 QUESTIONS 1 & 2: 2

## 2023-11-20 ASSESSMENT — PAIN SCALES - GENERAL: PAINLEVEL: 2

## 2023-11-20 NOTE — ASSESSMENT & PLAN NOTE
Recurrently flairs up  She will finish up the 3 days of prednisone remaining  Will also start her on Plaquenil to see if that helps.

## 2023-11-20 NOTE — PROGRESS NOTES
Subjective   Patient ID: Kelsi Montejo is a 62 y.o. female who presents for Follow-up (Chronic pain CSA/UDS due today).  She is still having cognitive issues since having COVID. She has difficulty learning new information and gets overwhelmed if routines/processes at work changes. She has also noticed that she gets upset easily, irritable and angers easily also. She is frustrated with her lack of improvement since covid. She has intermittent abdominal pain which GI believes may be related to mood/anxiety. At her last visit, I started her on buspar qAM and lorazepam prn. She takes the lorazepam about two days per week.      pain joint pain and fibromyalgia   She has a history of fibromyalgia and polyarthralgia. She was seeing Dr. Garcias until he left his practice a few years ago. She is concerned about bilateral arm pain (particularly around elbows) and hand pain. Ortho though it might be tennis elbow and referred her for PT but they felt that her pain was too widespread to be epicondylitis. At her last visit, I treated her with a long and slow steroid taper which she has three days left of. She is concerned that the pain keeps returning and also travels to new areas. Most recently it was in her right jaw. She saw her dentist and they did not find anything to explain the pain she was having in that area.         OARRS:  Victoria Epstein DO on 11/20/2023  8:05 AM  I have personally reviewed the OARRS report for Kelsi Montejo. I have considered the risks of abuse, dependence, addiction and diversion and I believe that it is clinically appropriate for Kelsi Montejo to be prescribed this medication    Is the patient prescribed a combination of a benzodiazepine and opioid?  No    Last Urine Drug Screen / ordered today: Yes  No results found for this or any previous visit (from the past 8760 hour(s)).  N/A    Controlled Substance Agreement:  Date of the Last Agreement: 9/2023  Reviewed Controlled Substance Agreement  including but not limited to the benefits, risks, and alternatives to treatment with a Controlled Substance medication(s).    Benzodiazepines:  What is the patient's goal of therapy? Control panic/severe anxiety  Is this being achieved with current treatment? Yes, using lorazepam about twice per week      Objective     Visit Vitals  /64 (BP Location: Left arm, Patient Position: Sitting, BP Cuff Size: Adult)   Pulse 74   Temp 35.9 °C (96.6 °F) (Temporal)   Resp 16   Wt 90.3 kg (199 lb)   SpO2 99%   BMI 31.64 kg/m²   Smoking Status Former   BSA 2.06 m²        Constitutional: Well nourished, well developed, appears stated age  Eyes: no scleral icterus, no conjunctival injection  Respiratory: normal respiratory effort  Neurologic: Alert, CNs II-XII grossly intact..  Psych: Appropriate mood and affect.        Assessment/Plan   Problem List Items Addressed This Visit       Thrombocythemia    Relevant Orders    CBC and Auto Differential    Chronic insomnia - Primary (Chronic)     CSA on file 9/2023  UDS updated today 11/2023  Continue zolpidem 10 mg          Relevant Medications    zolpidem (Ambien) 10 mg tablet (Start on 11/29/2023)    Hypothyroidism, adult    Relevant Orders    TSH with reflex to Free T4 if abnormal    Polyarthralgia     Recurrently flairs up  She will finish up the 3 days of prednisone remaining  Will also start her on Plaquenil to see if that helps.          Relevant Medications    hydroxychloroquine (Plaquenil) 200 mg tablet    Generalized anxiety disorder with panic attacks     Continue Buspar in the morning  Continue lorazepam which she takes about twice per week         Relevant Medications    LORazepam (Ativan) 0.5 mg tablet (Start on 11/29/2023)    Hypercalcemia    Relevant Orders    Comprehensive metabolic panel     Other Visit Diagnoses       Screening for diabetes mellitus        Relevant Orders    Hemoglobin A1c    COVID-19 long hauler manifesting chronic concentration deficit         Relevant Orders    Referral to Adult Neuropsychology    Medication management        Relevant Orders    Drug Screen, Urine With Reflex to Confirmation            Keep scheduled appointment.     Victoria Epstein, DO

## 2023-11-20 NOTE — PATIENT INSTRUCTIONS
Thank you for choosing [facility] for your healthcare.   As always if you have any questions or concerns please do not hesitate to call our office at [phone] or through [employer].    Have a great day!  Victoria Epstein, DO

## 2023-11-27 ENCOUNTER — LAB (OUTPATIENT)
Dept: LAB | Facility: LAB | Age: 63
End: 2023-11-27
Payer: COMMERCIAL

## 2023-11-27 DIAGNOSIS — E03.9 HYPOTHYROIDISM, ADULT: ICD-10-CM

## 2023-11-27 DIAGNOSIS — Z13.1 SCREENING FOR DIABETES MELLITUS: ICD-10-CM

## 2023-11-27 DIAGNOSIS — E83.52 HYPERCALCEMIA: ICD-10-CM

## 2023-11-27 DIAGNOSIS — Z79.899 MEDICATION MANAGEMENT: ICD-10-CM

## 2023-11-27 DIAGNOSIS — D75.839 THROMBOCYTHEMIA: ICD-10-CM

## 2023-11-27 LAB
ALBUMIN SERPL BCP-MCNC: 4.1 G/DL (ref 3.4–5)
ALP SERPL-CCNC: 93 U/L (ref 33–136)
ALT SERPL W P-5'-P-CCNC: 13 U/L (ref 7–45)
AMPHETAMINES UR QL SCN: NORMAL
ANION GAP SERPL CALC-SCNC: 14 MMOL/L (ref 10–20)
AST SERPL W P-5'-P-CCNC: 14 U/L (ref 9–39)
BARBITURATES UR QL SCN: NORMAL
BASOPHILS # BLD AUTO: 0.09 X10*3/UL (ref 0–0.1)
BASOPHILS NFR BLD AUTO: 0.7 %
BENZODIAZ UR QL SCN: NORMAL
BILIRUB SERPL-MCNC: 0.4 MG/DL (ref 0–1.2)
BUN SERPL-MCNC: 18 MG/DL (ref 6–23)
BZE UR QL SCN: NORMAL
CALCIUM SERPL-MCNC: 8.8 MG/DL (ref 8.6–10.3)
CANNABINOIDS UR QL SCN: NORMAL
CHLORIDE SERPL-SCNC: 102 MMOL/L (ref 98–107)
CO2 SERPL-SCNC: 26 MMOL/L (ref 21–32)
CREAT SERPL-MCNC: 0.85 MG/DL (ref 0.5–1.05)
EOSINOPHIL # BLD AUTO: 0.14 X10*3/UL (ref 0–0.7)
EOSINOPHIL NFR BLD AUTO: 1.1 %
ERYTHROCYTE [DISTWIDTH] IN BLOOD BY AUTOMATED COUNT: 14.5 % (ref 11.5–14.5)
FENTANYL+NORFENTANYL UR QL SCN: NORMAL
GFR SERPL CREATININE-BSD FRML MDRD: 78 ML/MIN/1.73M*2
GLUCOSE SERPL-MCNC: 92 MG/DL (ref 74–99)
HCT VFR BLD AUTO: 39.1 % (ref 36–46)
HGB BLD-MCNC: 12.1 G/DL (ref 12–16)
IMM GRANULOCYTES # BLD AUTO: 0.07 X10*3/UL (ref 0–0.7)
IMM GRANULOCYTES NFR BLD AUTO: 0.6 % (ref 0–0.9)
LYMPHOCYTES # BLD AUTO: 2.72 X10*3/UL (ref 1.2–4.8)
LYMPHOCYTES NFR BLD AUTO: 22.1 %
MCH RBC QN AUTO: 29.2 PG (ref 26–34)
MCHC RBC AUTO-ENTMCNC: 30.9 G/DL (ref 32–36)
MCV RBC AUTO: 94 FL (ref 80–100)
MONOCYTES # BLD AUTO: 0.92 X10*3/UL (ref 0.1–1)
MONOCYTES NFR BLD AUTO: 7.5 %
NEUTROPHILS # BLD AUTO: 8.38 X10*3/UL (ref 1.2–7.7)
NEUTROPHILS NFR BLD AUTO: 68 %
NRBC BLD-RTO: 0 /100 WBCS (ref 0–0)
OPIATES UR QL SCN: NORMAL
OXYCODONE+OXYMORPHONE UR QL SCN: NORMAL
PCP UR QL SCN: NORMAL
PLATELET # BLD AUTO: 485 X10*3/UL (ref 150–450)
POTASSIUM SERPL-SCNC: 3.8 MMOL/L (ref 3.5–5.3)
PROT SERPL-MCNC: 6.9 G/DL (ref 6.4–8.2)
RBC # BLD AUTO: 4.14 X10*6/UL (ref 4–5.2)
SODIUM SERPL-SCNC: 138 MMOL/L (ref 136–145)
TSH SERPL-ACNC: 2.08 MIU/L (ref 0.44–3.98)
WBC # BLD AUTO: 12.3 X10*3/UL (ref 4.4–11.3)

## 2023-11-27 PROCEDURE — 84443 ASSAY THYROID STIM HORMONE: CPT

## 2023-11-27 PROCEDURE — 83036 HEMOGLOBIN GLYCOSYLATED A1C: CPT

## 2023-11-27 PROCEDURE — 80053 COMPREHEN METABOLIC PANEL: CPT

## 2023-11-27 PROCEDURE — 80307 DRUG TEST PRSMV CHEM ANLYZR: CPT

## 2023-11-27 PROCEDURE — 85025 COMPLETE CBC W/AUTO DIFF WBC: CPT

## 2023-11-27 PROCEDURE — 36415 COLL VENOUS BLD VENIPUNCTURE: CPT

## 2023-11-28 LAB
EST. AVERAGE GLUCOSE BLD GHB EST-MCNC: 134 MG/DL
HBA1C MFR BLD: 6.3 %

## 2023-11-29 ENCOUNTER — TELEPHONE (OUTPATIENT)
Dept: PRIMARY CARE | Facility: CLINIC | Age: 63
End: 2023-11-29
Payer: COMMERCIAL

## 2023-11-29 DIAGNOSIS — L50.9 URTICARIA: Primary | ICD-10-CM

## 2023-11-29 NOTE — TELEPHONE ENCOUNTER
Pt was started on Plaquenil about a week and has recently is now getting hives on her neck, face, head, chest, and back.  They are very itchy.  She has been using Benadryl and that is helping but makes her sleepy so she can only take it in the evening.     [facility] [place] - [place]

## 2023-11-30 RX ORDER — HYDROXYZINE HYDROCHLORIDE 25 MG/1
25 TABLET, FILM COATED ORAL EVERY 8 HOURS PRN
Qty: 30 TABLET | Refills: 0 | Status: SHIPPED | OUTPATIENT
Start: 2023-11-30 | End: 2024-01-15

## 2023-11-30 NOTE — TELEPHONE ENCOUNTER
Please have her stop the Plaquenil since it sounds like she is having an allergic reaction. I sent in hydroxyzine for the itching/hives. Thanks,  Victoria Epstein, DO

## 2023-12-01 RX ORDER — PREDNISONE 20 MG/1
20 TABLET ORAL DAILY
Qty: 5 TABLET | Refills: 0 | Status: SHIPPED | OUTPATIENT
Start: 2023-12-01 | End: 2023-12-06

## 2023-12-01 NOTE — TELEPHONE ENCOUNTER
Pt said that she has already taken 4 tablets of the hydroxyzine and is not noticing any results. Pt would like you to advise.

## 2024-01-15 ENCOUNTER — LAB (OUTPATIENT)
Dept: LAB | Facility: LAB | Age: 64
End: 2024-01-15
Payer: COMMERCIAL

## 2024-01-15 ENCOUNTER — OFFICE VISIT (OUTPATIENT)
Dept: PRIMARY CARE | Facility: CLINIC | Age: 64
End: 2024-01-15
Payer: COMMERCIAL

## 2024-01-15 VITALS
SYSTOLIC BLOOD PRESSURE: 126 MMHG | WEIGHT: 203 LBS | HEIGHT: 67 IN | TEMPERATURE: 96.8 F | DIASTOLIC BLOOD PRESSURE: 74 MMHG | OXYGEN SATURATION: 97 % | RESPIRATION RATE: 16 BRPM | HEART RATE: 74 BPM | BODY MASS INDEX: 31.86 KG/M2

## 2024-01-15 DIAGNOSIS — F41.0 GENERALIZED ANXIETY DISORDER WITH PANIC ATTACKS: Primary | ICD-10-CM

## 2024-01-15 DIAGNOSIS — D72.829 LEUKOCYTOSIS, UNSPECIFIED TYPE: ICD-10-CM

## 2024-01-15 DIAGNOSIS — E03.9 HYPOTHYROIDISM, ADULT: ICD-10-CM

## 2024-01-15 DIAGNOSIS — D75.839 THROMBOCYTHEMIA: ICD-10-CM

## 2024-01-15 DIAGNOSIS — L29.9 PRURITUS: ICD-10-CM

## 2024-01-15 DIAGNOSIS — E83.52 HYPERCALCEMIA: ICD-10-CM

## 2024-01-15 DIAGNOSIS — M79.602 PAIN IN BOTH UPPER EXTREMITIES: ICD-10-CM

## 2024-01-15 DIAGNOSIS — F51.04 CHRONIC INSOMNIA: Chronic | ICD-10-CM

## 2024-01-15 DIAGNOSIS — D75.839 THROMBOCYTHEMIA: Primary | ICD-10-CM

## 2024-01-15 DIAGNOSIS — M79.601 PAIN IN BOTH UPPER EXTREMITIES: ICD-10-CM

## 2024-01-15 DIAGNOSIS — K21.9 GASTROESOPHAGEAL REFLUX DISEASE WITHOUT ESOPHAGITIS: ICD-10-CM

## 2024-01-15 DIAGNOSIS — F41.1 GENERALIZED ANXIETY DISORDER WITH PANIC ATTACKS: Primary | ICD-10-CM

## 2024-01-15 DIAGNOSIS — M25.50 POLYARTHRALGIA: ICD-10-CM

## 2024-01-15 DIAGNOSIS — G25.81 RLS (RESTLESS LEGS SYNDROME): ICD-10-CM

## 2024-01-15 LAB
ALBUMIN SERPL BCP-MCNC: 4 G/DL (ref 3.4–5)
ALP SERPL-CCNC: 105 U/L (ref 33–136)
ALT SERPL W P-5'-P-CCNC: 12 U/L (ref 7–45)
ANION GAP SERPL CALC-SCNC: 13 MMOL/L (ref 10–20)
AST SERPL W P-5'-P-CCNC: 16 U/L (ref 9–39)
BASOPHILS # BLD AUTO: 0.13 X10*3/UL (ref 0–0.1)
BASOPHILS NFR BLD AUTO: 1 %
BILIRUB SERPL-MCNC: 0.4 MG/DL (ref 0–1.2)
BUN SERPL-MCNC: 18 MG/DL (ref 6–23)
CALCIUM SERPL-MCNC: 9 MG/DL (ref 8.6–10.6)
CHLORIDE SERPL-SCNC: 104 MMOL/L (ref 98–107)
CO2 SERPL-SCNC: 28 MMOL/L (ref 21–32)
CREAT SERPL-MCNC: 0.69 MG/DL (ref 0.5–1.05)
EGFRCR SERPLBLD CKD-EPI 2021: >90 ML/MIN/1.73M*2
EOSINOPHIL # BLD AUTO: 0.13 X10*3/UL (ref 0–0.7)
EOSINOPHIL NFR BLD AUTO: 1 %
ERYTHROCYTE [DISTWIDTH] IN BLOOD BY AUTOMATED COUNT: 14 % (ref 11.5–14.5)
ERYTHROCYTE [SEDIMENTATION RATE] IN BLOOD BY WESTERGREN METHOD: 19 MM/H (ref 0–30)
GLUCOSE SERPL-MCNC: 107 MG/DL (ref 74–99)
HCT VFR BLD AUTO: 39.5 % (ref 36–46)
HGB BLD-MCNC: 12.5 G/DL (ref 12–16)
IMM GRANULOCYTES # BLD AUTO: 0.07 X10*3/UL (ref 0–0.7)
IMM GRANULOCYTES NFR BLD AUTO: 0.5 % (ref 0–0.9)
LYMPHOCYTES # BLD AUTO: 1.94 X10*3/UL (ref 1.2–4.8)
LYMPHOCYTES NFR BLD AUTO: 14.4 %
MCH RBC QN AUTO: 29.8 PG (ref 26–34)
MCHC RBC AUTO-ENTMCNC: 31.6 G/DL (ref 32–36)
MCV RBC AUTO: 94 FL (ref 80–100)
MONOCYTES # BLD AUTO: 1.22 X10*3/UL (ref 0.1–1)
MONOCYTES NFR BLD AUTO: 9.1 %
NEUTROPHILS # BLD AUTO: 9.96 X10*3/UL (ref 1.2–7.7)
NEUTROPHILS NFR BLD AUTO: 74 %
NRBC BLD-RTO: 0 /100 WBCS (ref 0–0)
PLATELET # BLD AUTO: 504 X10*3/UL (ref 150–450)
POTASSIUM SERPL-SCNC: 3.7 MMOL/L (ref 3.5–5.3)
PROT SERPL-MCNC: 6.8 G/DL (ref 6.4–8.2)
RBC # BLD AUTO: 4.2 X10*6/UL (ref 4–5.2)
SODIUM SERPL-SCNC: 141 MMOL/L (ref 136–145)
WBC # BLD AUTO: 13.5 X10*3/UL (ref 4.4–11.3)

## 2024-01-15 PROCEDURE — 85652 RBC SED RATE AUTOMATED: CPT

## 2024-01-15 PROCEDURE — 1036F TOBACCO NON-USER: CPT | Performed by: FAMILY MEDICINE

## 2024-01-15 PROCEDURE — 80053 COMPREHEN METABOLIC PANEL: CPT

## 2024-01-15 PROCEDURE — 36415 COLL VENOUS BLD VENIPUNCTURE: CPT

## 2024-01-15 PROCEDURE — 99214 OFFICE O/P EST MOD 30 MIN: CPT | Performed by: FAMILY MEDICINE

## 2024-01-15 PROCEDURE — 85025 COMPLETE CBC W/AUTO DIFF WBC: CPT

## 2024-01-15 RX ORDER — PANTOPRAZOLE SODIUM 40 MG/1
40 TABLET, DELAYED RELEASE ORAL 2 TIMES DAILY
Qty: 180 TABLET | Refills: 3 | Status: SHIPPED | OUTPATIENT
Start: 2024-01-15 | End: 2025-01-14

## 2024-01-15 RX ORDER — GABAPENTIN 300 MG/1
300 CAPSULE ORAL NIGHTLY
Qty: 30 CAPSULE | Refills: 2 | Status: SHIPPED | OUTPATIENT
Start: 2024-01-15 | End: 2024-01-15 | Stop reason: SDUPTHER

## 2024-01-15 RX ORDER — LEVOCETIRIZINE DIHYDROCHLORIDE 5 MG/1
5 TABLET, FILM COATED ORAL EVERY EVENING
Qty: 30 TABLET | Refills: 3 | Status: SHIPPED | OUTPATIENT
Start: 2024-01-15 | End: 2024-05-14

## 2024-01-15 RX ORDER — LEVOTHYROXINE SODIUM 100 UG/1
100 TABLET ORAL DAILY
Qty: 90 TABLET | Refills: 3 | Status: SHIPPED | OUTPATIENT
Start: 2024-01-15 | End: 2025-01-14

## 2024-01-15 RX ORDER — PREDNISONE 10 MG/1
TABLET ORAL
Qty: 70 TABLET | Refills: 0 | Status: SHIPPED | OUTPATIENT
Start: 2024-01-15 | End: 2024-04-05 | Stop reason: SDUPTHER

## 2024-01-15 RX ORDER — GABAPENTIN 300 MG/1
300 CAPSULE ORAL NIGHTLY
Qty: 30 CAPSULE | Refills: 2 | Status: SHIPPED | OUTPATIENT
Start: 2024-01-15 | End: 2024-04-14

## 2024-01-15 SDOH — ECONOMIC STABILITY: FOOD INSECURITY: WITHIN THE PAST 12 MONTHS, YOU WORRIED THAT YOUR FOOD WOULD RUN OUT BEFORE YOU GOT MONEY TO BUY MORE.: NEVER TRUE

## 2024-01-15 SDOH — ECONOMIC STABILITY: FOOD INSECURITY: WITHIN THE PAST 12 MONTHS, THE FOOD YOU BOUGHT JUST DIDN'T LAST AND YOU DIDN'T HAVE MONEY TO GET MORE.: NEVER TRUE

## 2024-01-15 ASSESSMENT — PATIENT HEALTH QUESTIONNAIRE - PHQ9
SUM OF ALL RESPONSES TO PHQ9 QUESTIONS 1 & 2: 0
1. LITTLE INTEREST OR PLEASURE IN DOING THINGS: NOT AT ALL
2. FEELING DOWN, DEPRESSED OR HOPELESS: NOT AT ALL

## 2024-01-15 ASSESSMENT — LIFESTYLE VARIABLES
HOW OFTEN DO YOU HAVE A DRINK CONTAINING ALCOHOL: NEVER
AUDIT-C TOTAL SCORE: 0
HOW MANY STANDARD DRINKS CONTAINING ALCOHOL DO YOU HAVE ON A TYPICAL DAY: PATIENT DOES NOT DRINK
HOW OFTEN DO YOU HAVE SIX OR MORE DRINKS ON ONE OCCASION: NEVER
SKIP TO QUESTIONS 9-10: 1

## 2024-01-15 NOTE — ASSESSMENT & PLAN NOTE
Recurrently flairs up  Will do another prednisone taper  Plaquenil seemed to help but it caused a rash/hives after a few days so I recommended she discontinue it.  Briefly discussed LDN as possible medication to try in the future.

## 2024-01-15 NOTE — PATIENT INSTRUCTIONS
Thank you for choosing [facility] for your healthcare.   As always if you have any questions or concerns please do not hesitate to call our office at [phone] or through [employer].    Have a great day!  Victoria Epstein, DO

## 2024-01-15 NOTE — PROGRESS NOTES
Subjective   Patient ID: Kelsi Montejo is a 63 y.o. female who presents for Follow-up (Patient fell in parking lot and hurt knees).  Patient fell outside on her way into our building. She landed on both her knees. They are both sore currently L>R.     She is still having cognitive issues since having COVID. She has difficulty learning new information and gets overwhelmed if routines/processes at work changes. She has also noticed that she gets upset easily, irritable and angers easily also. She is frustrated with her lack of improvement since covid. She has intermittent abdominal pain which GI believes may be related to mood/anxiety. Since her last visit, she stopped taking Buspar which was no longer helping and was actually seeming to worsen symptoms. She takes the lorazepam about two days per week. 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 She has found adaptations so that she can continue to work with her cognitive issues and this pharmacist interrupts that work flow.      pain joint pain and fibromyalgia   She has a history of fibromyalgia and polyarthralgia. She was seeing Dr. Garcias until he left his practice a few years ago. She is concerned about bilateral arm pain (particularly around elbows) and hand pain. Ortho though it might be tennis elbow in 2023 and referred her for PT but they felt that her pain was too widespread to be epicondylitis. In the past, I have treated her with a long and slow steroid taper which provide relief for several weeks. At her last visit, I prescribed Plaquenil. She was only able to take it for a few days due to it caused a wide spread itchy rash. It did seem to help with her chronic joint pain while she was on it.         Current Outpatient Medications   Medication Sig Dispense Refill    ergocalciferol (Vitamin D-2) 1.25 MG (45558 UT) capsule Take 1 capsule (1,250 mcg) by mouth 1 (one) time per week.      famotidine  (Pepcid) 20 mg tablet Take by mouth once daily.      LORazepam (Ativan) 0.5 mg tablet Take 1 tablet (0.5 mg) by mouth once daily as needed for anxiety for up to 14 days. Do not start before November 29, 2023. 14 tablet 0    zolpidem (Ambien) 10 mg tablet TAKE 1 TABLET BY MOUTH AT BEDTIME AS NEEDED Do not start before November 29, 2023. 90 tablet 0    albuterol 90 mcg/actuation inhaler INHALE 2 PUFFS into the lungs EVERY 6 HOURS AS NEEDED for SHORTNESS OF BREATH, coughing, or wheezing      gabapentin (Neurontin) 300 mg capsule Take 1 capsule (300 mg) by mouth once daily at bedtime. 30 capsule 2    levocetirizine (Xyzal) 5 mg tablet Take 1 tablet (5 mg) by mouth once daily in the evening. 30 tablet 3    levothyroxine (Synthroid, Levoxyl) 100 mcg tablet Take 1 tablet (100 mcg) by mouth once daily. 90 tablet 3    pantoprazole (ProtoNix) 40 mg EC tablet Take 1 tablet (40 mg) by mouth 2 times a day. 180 tablet 3    predniSONE (Deltasone) 10 mg tablet Take 4 tablets (40 mg) by mouth once daily for 7 days, THEN 3 tablets (30 mg) once daily for 7 days, THEN 2 tablets (20 mg) once daily for 7 days, THEN 1 tablet (10 mg) once daily for 7 days. 70 tablet 0     No current facility-administered medications for this visit.     OARRS:  Victoria Epstein DO on 1/15/2024 11:02 AM  I have personally reviewed the OARRS report for Kelsi Montejo. I have considered the risks of abuse, dependence, addiction and diversion and I believe that it is clinically appropriate for Kelsi Montejo to be prescribed this medication    Is the patient prescribed a combination of a benzodiazepine and opioid?  No    Last Urine Drug Screen / ordered today: No  Recent Results (from the past 8760 hour(s))   Drug Screen, Urine With Reflex to Confirmation    Collection Time: 11/27/23  2:32 PM   Result Value Ref Range    Amphetamine Screen, Urine Presumptive Negative Presumptive Negative    Barbiturate Screen, Urine Presumptive Negative Presumptive Negative  "   Benzodiazepines Screen, Urine Presumptive Negative Presumptive Negative    Cannabinoid Screen, Urine Presumptive Negative Presumptive Negative    Cocaine Metabolite Screen, Urine Presumptive Negative Presumptive Negative    Fentanyl Screen, Urine Presumptive Negative Presumptive Negative    Opiate Screen, Urine Presumptive Negative Presumptive Negative    Oxycodone Screen, Urine Presumptive Negative Presumptive Negative    PCP Screen, Urine Presumptive Negative Presumptive Negative     Results are as expected.         Controlled Substance Agreement:  Date of the Last Agreement: 9/2023  Reviewed Controlled Substance Agreement including but not limited to the benefits, risks, and alternatives to treatment with a Controlled Substance medication(s).    Benzodiazepines:  What is the patient's goal of therapy? Control episodes of severe anxiety  Is this being achieved with current treatment? yes      Activities of Daily Living:   Is your overall impression that this patient is benefiting (symptom reduction outweighs side effects) from benzodiazepine therapy? Yes     1. Physical Functioning: Same  2. Family Relationship: Same  3. Social Relationship: Same  4. Mood: Same  5. Sleep Patterns: Same  6. Overall Function: Same and     Sleep Aids:   What is the patient's goal of therapy? Better sleep quality/quantity  Is this being achieved with current treatment? yes    Activities of Daily Living:   Is your overall impression that this patient is benefiting (symptom reduction outweighs side effects) from sleep aid therapy? Yes     1. Physical Functioning: Same  2. Family Relationship: Same  3. Social Relationship: Same  4. Mood: Same  5. Sleep Patterns: Same  6. Overall Function: Same      Objective     Visit Vitals  /74 (BP Location: Left arm, Patient Position: Sitting, BP Cuff Size: Adult)   Pulse 74   Temp 36 °C (96.8 °F) (Temporal)   Resp 16   Ht 1.702 m (5' 7\")   Wt 92.1 kg (203 lb)   SpO2 97%   BMI 31.79 kg/m² "   Smoking Status Former   BSA 2.09 m²        Constitutional: Well nourished, well developed, appears stated age  Eyes: no scleral icterus, no conjunctival injection  Respiratory: normal respiratory effort  Musculoskeletal: left knee joint slightly edematous   Skin: Warm, dry. Abrasion of left knee  Neurologic: Alert, CNs II-XII grossly intact..  Psych: Appropriate mood and affect.        Assessment/Plan   Problem List Items Addressed This Visit       Thrombocythemia    Relevant Orders    CBC and Auto Differential    GERD (gastroesophageal reflux disease)    Relevant Medications    pantoprazole (ProtoNix) 40 mg EC tablet    Chronic insomnia (Chronic)     CSA on file 9/2023  UDS on file 11/2023  Continue zolpidem 10 mg          Hypothyroidism, adult    Relevant Medications    levothyroxine (Synthroid, Levoxyl) 100 mcg tablet    Polyarthralgia (Chronic)     Recurrently flairs up  Will do another prednisone taper  Plaquenil seemed to help but it caused a rash/hives after a few days so I recommended she discontinue it.  Briefly discussed LDN as possible medication to try in the future.          Relevant Medications    predniSONE (Deltasone) 10 mg tablet    Other Relevant Orders    Sedimentation Rate    Generalized anxiety disorder with panic attacks - Primary (Chronic)     Continue Buspar in the morning  Continue lorazepam which she takes about twice per week         RLS (restless legs syndrome) (Chronic)     Takes gabapentin prn, continue, refilled         Relevant Medications    gabapentin (Neurontin) 300 mg capsule    Upper extremity pain    Relevant Medications    predniSONE (Deltasone) 10 mg tablet    Hypercalcemia    Relevant Orders    Comprehensive metabolic panel     Other Visit Diagnoses       Leukocytosis, unspecified type        Relevant Orders    CBC and Auto Differential    Pruritus        Relevant Medications    levocetirizine (Xyzal) 5 mg tablet            Follow up 3 months. Declined  mammogram.    Victoria Epstein, DO

## 2024-01-24 DIAGNOSIS — F41.0 GENERALIZED ANXIETY DISORDER WITH PANIC ATTACKS: ICD-10-CM

## 2024-01-24 DIAGNOSIS — F41.1 GENERALIZED ANXIETY DISORDER WITH PANIC ATTACKS: ICD-10-CM

## 2024-01-24 RX ORDER — LORAZEPAM 0.5 MG/1
TABLET ORAL
Qty: 14 TABLET | Refills: 0 | Status: SHIPPED | OUTPATIENT
Start: 2024-01-24 | End: 2024-02-21

## 2024-02-12 ENCOUNTER — LAB (OUTPATIENT)
Dept: LAB | Facility: LAB | Age: 64
End: 2024-02-12
Payer: COMMERCIAL

## 2024-02-12 ENCOUNTER — PATIENT MESSAGE (OUTPATIENT)
Dept: PRIMARY CARE | Facility: CLINIC | Age: 64
End: 2024-02-12
Payer: COMMERCIAL

## 2024-02-12 DIAGNOSIS — R30.0 DYSURIA: ICD-10-CM

## 2024-02-12 DIAGNOSIS — D75.839 THROMBOCYTHEMIA: ICD-10-CM

## 2024-02-12 DIAGNOSIS — R30.0 DYSURIA: Primary | ICD-10-CM

## 2024-02-12 LAB
APPEARANCE UR: CLEAR
BILIRUB UR STRIP.AUTO-MCNC: NEGATIVE MG/DL
COLOR UR: COLORLESS
GLUCOSE UR STRIP.AUTO-MCNC: NEGATIVE MG/DL
KETONES UR STRIP.AUTO-MCNC: NEGATIVE MG/DL
LEUKOCYTE ESTERASE UR QL STRIP.AUTO: ABNORMAL
NITRITE UR QL STRIP.AUTO: NEGATIVE
PH UR STRIP.AUTO: 7 [PH]
PROT UR STRIP.AUTO-MCNC: NEGATIVE MG/DL
RBC # UR STRIP.AUTO: ABNORMAL /UL
RBC #/AREA URNS AUTO: ABNORMAL /HPF
SP GR UR STRIP.AUTO: 1
UROBILINOGEN UR STRIP.AUTO-MCNC: <2 MG/DL
WBC #/AREA URNS AUTO: ABNORMAL /HPF

## 2024-02-12 PROCEDURE — 81001 URINALYSIS AUTO W/SCOPE: CPT

## 2024-02-13 RX ORDER — SULFAMETHOXAZOLE AND TRIMETHOPRIM 800; 160 MG/1; MG/1
1 TABLET ORAL 2 TIMES DAILY
Qty: 6 TABLET | Refills: 0 | Status: SHIPPED | OUTPATIENT
Start: 2024-02-13 | End: 2024-02-21 | Stop reason: SDUPTHER

## 2024-02-21 DIAGNOSIS — F41.0 GENERALIZED ANXIETY DISORDER WITH PANIC ATTACKS: ICD-10-CM

## 2024-02-21 DIAGNOSIS — F41.1 GENERALIZED ANXIETY DISORDER WITH PANIC ATTACKS: ICD-10-CM

## 2024-02-21 RX ORDER — LORAZEPAM 0.5 MG/1
TABLET ORAL
Qty: 14 TABLET | Refills: 0 | Status: SHIPPED | OUTPATIENT
Start: 2024-02-21 | End: 2024-04-17

## 2024-02-21 RX ORDER — SULFAMETHOXAZOLE AND TRIMETHOPRIM 800; 160 MG/1; MG/1
1 TABLET ORAL 2 TIMES DAILY
Qty: 6 TABLET | Refills: 0 | Status: SHIPPED | OUTPATIENT
Start: 2024-02-21 | End: 2024-02-24

## 2024-03-13 DIAGNOSIS — F51.04 CHRONIC INSOMNIA: ICD-10-CM

## 2024-03-13 RX ORDER — ZOLPIDEM TARTRATE 10 MG/1
TABLET ORAL
Qty: 90 TABLET | Refills: 0 | Status: SHIPPED | OUTPATIENT
Start: 2024-03-13

## 2024-04-01 ENCOUNTER — LAB (OUTPATIENT)
Dept: LAB | Facility: LAB | Age: 64
End: 2024-04-01
Payer: COMMERCIAL

## 2024-04-01 DIAGNOSIS — D75.839 THROMBOCYTHEMIA: ICD-10-CM

## 2024-04-01 LAB
BASOPHILS # BLD AUTO: 0.15 X10*3/UL (ref 0–0.1)
BASOPHILS NFR BLD AUTO: 1.5 %
CRP SERPL-MCNC: 0.8 MG/DL
EOSINOPHIL # BLD AUTO: 0.27 X10*3/UL (ref 0–0.7)
EOSINOPHIL NFR BLD AUTO: 2.8 %
ERYTHROCYTE [DISTWIDTH] IN BLOOD BY AUTOMATED COUNT: 14.2 % (ref 11.5–14.5)
ERYTHROCYTE [SEDIMENTATION RATE] IN BLOOD BY WESTERGREN METHOD: 36 MM/H (ref 0–30)
HCT VFR BLD AUTO: 38.1 % (ref 36–46)
HGB BLD-MCNC: 12.2 G/DL (ref 12–16)
IMM GRANULOCYTES # BLD AUTO: 0.03 X10*3/UL (ref 0–0.7)
IMM GRANULOCYTES NFR BLD AUTO: 0.3 % (ref 0–0.9)
LYMPHOCYTES # BLD AUTO: 2.34 X10*3/UL (ref 1.2–4.8)
LYMPHOCYTES NFR BLD AUTO: 24 %
MCH RBC QN AUTO: 29.3 PG (ref 26–34)
MCHC RBC AUTO-ENTMCNC: 32 G/DL (ref 32–36)
MCV RBC AUTO: 92 FL (ref 80–100)
MONOCYTES # BLD AUTO: 1.07 X10*3/UL (ref 0.1–1)
MONOCYTES NFR BLD AUTO: 11 %
NEUTROPHILS # BLD AUTO: 5.89 X10*3/UL (ref 1.2–7.7)
NEUTROPHILS NFR BLD AUTO: 60.4 %
NRBC BLD-RTO: 0 /100 WBCS (ref 0–0)
PLATELET # BLD AUTO: 399 X10*3/UL (ref 150–450)
RBC # BLD AUTO: 4.16 X10*6/UL (ref 4–5.2)
WBC # BLD AUTO: 9.8 X10*3/UL (ref 4.4–11.3)

## 2024-04-01 PROCEDURE — 85652 RBC SED RATE AUTOMATED: CPT

## 2024-04-01 PROCEDURE — 36415 COLL VENOUS BLD VENIPUNCTURE: CPT

## 2024-04-01 PROCEDURE — 86140 C-REACTIVE PROTEIN: CPT

## 2024-04-01 PROCEDURE — 85025 COMPLETE CBC W/AUTO DIFF WBC: CPT

## 2024-04-05 ENCOUNTER — OFFICE VISIT (OUTPATIENT)
Dept: PRIMARY CARE | Facility: CLINIC | Age: 64
End: 2024-04-05
Payer: COMMERCIAL

## 2024-04-05 VITALS
SYSTOLIC BLOOD PRESSURE: 124 MMHG | TEMPERATURE: 96.8 F | DIASTOLIC BLOOD PRESSURE: 81 MMHG | BODY MASS INDEX: 32.96 KG/M2 | HEIGHT: 67 IN | RESPIRATION RATE: 16 BRPM | WEIGHT: 210 LBS | HEART RATE: 74 BPM
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
## 2024-04-05 DIAGNOSIS — U09.9 COVID-19 LONG HAULER MANIFESTING CHRONIC CONCENTRATION DEFICIT: Chronic | ICD-10-CM

## 2024-04-05 DIAGNOSIS — M79.601 PAIN IN BOTH UPPER EXTREMITIES: ICD-10-CM

## 2024-04-05 DIAGNOSIS — E03.9 HYPOTHYROIDISM, ADULT: ICD-10-CM

## 2024-04-05 DIAGNOSIS — M25.50 POLYARTHRALGIA: Chronic | ICD-10-CM

## 2024-04-05 DIAGNOSIS — Z11.3 SCREENING FOR STD (SEXUALLY TRANSMITTED DISEASE): ICD-10-CM

## 2024-04-05 DIAGNOSIS — R41.840 COVID-19 LONG HAULER MANIFESTING CHRONIC CONCENTRATION DEFICIT: Chronic | ICD-10-CM

## 2024-04-05 DIAGNOSIS — R63.5 WEIGHT GAIN: ICD-10-CM

## 2024-04-05 DIAGNOSIS — M79.602 PAIN IN BOTH UPPER EXTREMITIES: ICD-10-CM

## 2024-04-05 DIAGNOSIS — D75.839 THROMBOCYTHEMIA: ICD-10-CM

## 2024-04-05 DIAGNOSIS — Z00.00 ANNUAL PHYSICAL EXAM: Primary | ICD-10-CM

## 2024-04-05 DIAGNOSIS — Z13.1 SCREENING FOR DIABETES MELLITUS: ICD-10-CM

## 2024-04-05 PROCEDURE — 99396 PREV VISIT EST AGE 40-64: CPT | Performed by: FAMILY MEDICINE

## 2024-04-05 PROCEDURE — 1036F TOBACCO NON-USER: CPT | Performed by: FAMILY MEDICINE

## 2024-04-05 RX ORDER — PREDNISONE 10 MG/1
TABLET ORAL
Qty: 70 TABLET | Refills: 0 | Status: SHIPPED | OUTPATIENT
Start: 2024-04-05

## 2024-04-05 SDOH — ECONOMIC STABILITY: FOOD INSECURITY: WITHIN THE PAST 12 MONTHS, YOU WORRIED THAT YOUR FOOD WOULD RUN OUT BEFORE YOU GOT MONEY TO BUY MORE.: NEVER TRUE

## 2024-04-05 SDOH — ECONOMIC STABILITY: FOOD INSECURITY: WITHIN THE PAST 12 MONTHS, THE FOOD YOU BOUGHT JUST DIDN'T LAST AND YOU DIDN'T HAVE MONEY TO GET MORE.: NEVER TRUE

## 2024-04-05 ASSESSMENT — LIFESTYLE VARIABLES
AUDIT-C TOTAL SCORE: 0
SKIP TO QUESTIONS 9-10: 1
HOW MANY STANDARD DRINKS CONTAINING ALCOHOL DO YOU HAVE ON A TYPICAL DAY: PATIENT DOES NOT DRINK
HOW OFTEN DO YOU HAVE SIX OR MORE DRINKS ON ONE OCCASION: NEVER
HOW OFTEN DO YOU HAVE A DRINK CONTAINING ALCOHOL: NEVER

## 2024-04-05 ASSESSMENT — PATIENT HEALTH QUESTIONNAIRE - PHQ9
2. FEELING DOWN, DEPRESSED OR HOPELESS: SEVERAL DAYS
10. IF YOU CHECKED OFF ANY PROBLEMS, HOW DIFFICULT HAVE THESE PROBLEMS MADE IT FOR YOU TO DO YOUR WORK, TAKE CARE OF THINGS AT HOME, OR GET ALONG WITH OTHER PEOPLE: SOMEWHAT DIFFICULT
SUM OF ALL RESPONSES TO PHQ9 QUESTIONS 1 & 2: 2
1. LITTLE INTEREST OR PLEASURE IN DOING THINGS: SEVERAL DAYS

## 2024-04-05 ASSESSMENT — PAIN SCALES - GENERAL: PAINLEVEL: 6

## 2024-04-05 NOTE — PROGRESS NOTES
Subjective   Patient ID: Kelsi Montejo is a 63 y.o. female who presents for Annual Exam.  She had labs done and is here for review.     From last visit:  She is still having cognitive issues since having COVID. She has difficulty learning new information and gets overwhelmed if routines/processes at work changes. She has also noticed that she gets upset easily, irritable and angers easily also. She is frustrated with her lack of improvement since covid. She has intermittent abdominal pain which GI believes may be related to mood/anxiety. Since her last visit, she stopped taking Buspar which was no longer helping and was actually seeming to worsen symptoms. She takes the lorazepam about two days per week. 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 She has found adaptations so that she can continue to work with her cognitive issues and this pharmacist interrupts that work flow.      pain joint pain and fibromyalgia   She has a history of fibromyalgia and polyarthralgia. She was seeing Dr. Garcias until he left his practice a few years ago. She is concerned about bilateral arm pain (particularly around elbows) and hand pain. Ortho though it might be tennis elbow in 2023 and referred her for PT but they felt that her pain was too widespread to be epicondylitis. In the past, I have treated her with a long and slow steroid taper which provide relief for several weeks. Last year, I prescribed Plaquenil. She was only able to take it for a few days due to it caused a wide spread itchy rash. It did seem to help with her chronic joint pain while she was on it. She is hesetant to see rheumatology again because she is scared to try new medication.              Patient Care Team:  Victoria Epstein DO as PCP - General  Daniel Vera MD as Consulting Physician (Hematology and Oncology)    Current Outpatient Medications   Medication Sig Dispense Refill    albuterol 90  "mcg/actuation inhaler INHALE 2 PUFFS into the lungs EVERY 6 HOURS AS NEEDED for SHORTNESS OF BREATH, coughing, or wheezing      ergocalciferol (Vitamin D-2) 1.25 MG (37060 UT) capsule Take 1 capsule (1,250 mcg) by mouth 1 (one) time per week.      famotidine (Pepcid) 20 mg tablet Take by mouth once daily.      gabapentin (Neurontin) 300 mg capsule Take 1 capsule (300 mg) by mouth once daily at bedtime. 30 capsule 2    levocetirizine (Xyzal) 5 mg tablet Take 1 tablet (5 mg) by mouth once daily in the evening. 30 tablet 3    levothyroxine (Synthroid, Levoxyl) 100 mcg tablet Take 1 tablet (100 mcg) by mouth once daily. 90 tablet 3    LORazepam (Ativan) 0.5 mg tablet TAKE 1 TABLET BY MOUTH ONCE DAILY AS NEEDED FOR ANXIETY for up to 14 days 14 tablet 0    pantoprazole (ProtoNix) 40 mg EC tablet Take 1 tablet (40 mg) by mouth 2 times a day. 180 tablet 3    zolpidem (Ambien) 10 mg tablet TAKE 1 TABLET BY MOUTH AT BEDTIME AS NEEDED 90 tablet 0    predniSONE (Deltasone) 10 mg tablet Take 4 tablets (40 mg) by mouth once daily for 7 days, THEN 3 tablets (30 mg) once daily for 7 days, THEN 2 tablets (20 mg) once daily for 7 days, THEN 1 tablet (10 mg) once daily for 7 days. 70 tablet 0     No current facility-administered medications for this visit.       Objective     Visit Vitals  /81 (BP Location: Left arm, Patient Position: Sitting, BP Cuff Size: Adult long)   Pulse 74   Temp 36 °C (96.8 °F) (Temporal)   Resp 16   Ht 1.702 m (5' 7\")   Wt 95.3 kg (210 lb)   BMI 32.89 kg/m²   Smoking Status Former   BSA 2.12 m²        Constitutional: Well nourished, well developed, appears stated age  Eyes: no scleral icterus, no conjunctival injection  Ears: normal external auditory canal, no retraction or bulging of tympanic membranes  Neck: no thyromegaly  Cardiovascular: regular rate and rhythm, no leg edema  Respiratory: normal respiratory effort, clear to auscultation bilaterally  Neurologic: Alert, CNs II-XII grossly " intact..  Psych: Appropriate mood and affect.        Assessment/Plan   Problem List Items Addressed This Visit       Thrombocythemia    Relevant Orders    Comprehensive Metabolic Panel    CBC and Auto Differential    Hypothyroidism, adult    Relevant Orders    CBC and Auto Differential    TSH with reflex to Free T4 if abnormal    Polyarthralgia (Chronic)    Relevant Medications    predniSONE (Deltasone) 10 mg tablet    Other Relevant Orders    Comprehensive Metabolic Panel    Sedimentation Rate    C-reactive protein    Pain in both upper extremities (Chronic)    Relevant Medications    predniSONE (Deltasone) 10 mg tablet    COVID-19 long hauler manifesting chronic concentration deficit (Chronic)     Discussed possibly trying Low dose naltrexone          Other Visit Diagnoses       Annual physical exam    -  Primary    Declined mammogram today    Weight gain        Relevant Orders    Insulin, Fasting    Screening for diabetes mellitus        Relevant Orders    Hemoglobin A1C    Tick bite, unspecified site, subsequent encounter        Relevant Orders    Lyme disease, PCR    Screening for STD (sexually transmitted disease)        Relevant Orders    Syphilis Screen with Reflex            All pertinent lab work and results were reviewed with patient.     Follow up 3 months.    Victoria Epstein DO

## 2024-04-17 DIAGNOSIS — F41.1 GENERALIZED ANXIETY DISORDER WITH PANIC ATTACKS: ICD-10-CM

## 2024-04-17 DIAGNOSIS — F41.0 GENERALIZED ANXIETY DISORDER WITH PANIC ATTACKS: ICD-10-CM

## 2024-04-17 RX ORDER — LORAZEPAM 0.5 MG/1
TABLET ORAL
Qty: 14 TABLET | Refills: 0 | Status: SHIPPED | OUTPATIENT
Start: 2024-04-17

## 2024-06-13 ENCOUNTER — APPOINTMENT (OUTPATIENT)
Dept: PRIMARY CARE | Facility: CLINIC | Age: 64
End: 2024-06-13
Payer: COMMERCIAL

## 2024-06-13 VITALS
SYSTOLIC BLOOD PRESSURE: 122 MMHG | RESPIRATION RATE: 16 BRPM | WEIGHT: 206 LBS | TEMPERATURE: 97.1 F | BODY MASS INDEX: 32.26 KG/M2 | HEART RATE: 86 BPM | OXYGEN SATURATION: 93 % | DIASTOLIC BLOOD PRESSURE: 81 MMHG

## 2024-06-13 DIAGNOSIS — F51.04 CHRONIC INSOMNIA: ICD-10-CM

## 2024-06-13 DIAGNOSIS — R41.840 COVID-19 LONG HAULER MANIFESTING CHRONIC CONCENTRATION DEFICIT: Chronic | ICD-10-CM

## 2024-06-13 DIAGNOSIS — G25.81 RLS (RESTLESS LEGS SYNDROME): ICD-10-CM

## 2024-06-13 DIAGNOSIS — J06.9 ACUTE URI: ICD-10-CM

## 2024-06-13 DIAGNOSIS — E03.9 HYPOTHYROIDISM, ADULT: ICD-10-CM

## 2024-06-13 DIAGNOSIS — M79.601 PAIN IN BOTH UPPER EXTREMITIES: ICD-10-CM

## 2024-06-13 DIAGNOSIS — M25.50 POLYARTHRALGIA: Chronic | ICD-10-CM

## 2024-06-13 DIAGNOSIS — U09.9 COVID-19 LONG HAULER MANIFESTING CHRONIC CONCENTRATION DEFICIT: Chronic | ICD-10-CM

## 2024-06-13 DIAGNOSIS — E55.9 VITAMIN D DEFICIENCY: Primary | ICD-10-CM

## 2024-06-13 DIAGNOSIS — M79.602 PAIN IN BOTH UPPER EXTREMITIES: ICD-10-CM

## 2024-06-13 DIAGNOSIS — L29.9 PRURITUS: ICD-10-CM

## 2024-06-13 DIAGNOSIS — F41.0 GENERALIZED ANXIETY DISORDER WITH PANIC ATTACKS: ICD-10-CM

## 2024-06-13 DIAGNOSIS — F41.1 GENERALIZED ANXIETY DISORDER WITH PANIC ATTACKS: ICD-10-CM

## 2024-06-13 PROCEDURE — 99214 OFFICE O/P EST MOD 30 MIN: CPT | Performed by: FAMILY MEDICINE

## 2024-06-13 PROCEDURE — 1036F TOBACCO NON-USER: CPT | Performed by: FAMILY MEDICINE

## 2024-06-13 RX ORDER — LEVOTHYROXINE SODIUM 100 UG/1
100 TABLET ORAL DAILY
Qty: 90 TABLET | Refills: 3 | Status: SHIPPED | OUTPATIENT
Start: 2024-06-13 | End: 2025-06-13

## 2024-06-13 RX ORDER — PREDNISONE 10 MG/1
TABLET ORAL
Qty: 70 TABLET | Refills: 0 | Status: SHIPPED | OUTPATIENT
Start: 2024-06-13

## 2024-06-13 RX ORDER — ERGOCALCIFEROL 1.25 MG/1
1 CAPSULE ORAL
Qty: 12 CAPSULE | Refills: 3 | Status: SHIPPED | OUTPATIENT
Start: 2024-06-16 | End: 2025-06-16

## 2024-06-13 RX ORDER — LEVOFLOXACIN 250 MG/1
250 TABLET ORAL DAILY
Qty: 10 TABLET | Refills: 0 | Status: SHIPPED | OUTPATIENT
Start: 2024-06-13 | End: 2024-06-23

## 2024-06-13 RX ORDER — LORAZEPAM 0.5 MG/1
TABLET ORAL
Qty: 14 TABLET | Refills: 0 | Status: SHIPPED | OUTPATIENT
Start: 2024-06-13

## 2024-06-13 RX ORDER — METHYLPREDNISOLONE 4 MG/1
TABLET ORAL
COMMUNITY
Start: 2024-05-29 | End: 2024-06-13 | Stop reason: ALTCHOICE

## 2024-06-13 RX ORDER — BENZONATATE 200 MG/1
200 CAPSULE ORAL 3 TIMES DAILY PRN
Qty: 30 CAPSULE | Refills: 0 | Status: SHIPPED | OUTPATIENT
Start: 2024-06-13 | End: 2024-06-23

## 2024-06-13 RX ORDER — GABAPENTIN 300 MG/1
300 CAPSULE ORAL NIGHTLY
Qty: 90 CAPSULE | Refills: 1 | Status: SHIPPED | OUTPATIENT
Start: 2024-06-13 | End: 2024-12-10

## 2024-06-13 RX ORDER — DOXYCYCLINE 100 MG/1
CAPSULE ORAL
COMMUNITY
Start: 2024-05-29 | End: 2024-06-13 | Stop reason: ALTCHOICE

## 2024-06-13 RX ORDER — ZOLPIDEM TARTRATE 10 MG/1
TABLET ORAL
Qty: 90 TABLET | Refills: 0 | Status: SHIPPED | OUTPATIENT
Start: 2024-06-13

## 2024-06-13 RX ORDER — LEVOCETIRIZINE DIHYDROCHLORIDE 5 MG/1
5 TABLET, FILM COATED ORAL EVERY EVENING
Qty: 90 TABLET | Refills: 3 | Status: SHIPPED | OUTPATIENT
Start: 2024-06-13 | End: 2025-06-08

## 2024-06-13 SDOH — ECONOMIC STABILITY: FOOD INSECURITY: WITHIN THE PAST 12 MONTHS, THE FOOD YOU BOUGHT JUST DIDN'T LAST AND YOU DIDN'T HAVE MONEY TO GET MORE.: NEVER TRUE

## 2024-06-13 SDOH — ECONOMIC STABILITY: FOOD INSECURITY: WITHIN THE PAST 12 MONTHS, YOU WORRIED THAT YOUR FOOD WOULD RUN OUT BEFORE YOU GOT MONEY TO BUY MORE.: NEVER TRUE

## 2024-06-13 ASSESSMENT — PATIENT HEALTH QUESTIONNAIRE - PHQ9
7. TROUBLE CONCENTRATING ON THINGS, SUCH AS READING THE NEWSPAPER OR WATCHING TELEVISION: NEARLY EVERY DAY
2. FEELING DOWN, DEPRESSED OR HOPELESS: MORE THAN HALF THE DAYS
1. LITTLE INTEREST OR PLEASURE IN DOING THINGS: NEARLY EVERY DAY
8. MOVING OR SPEAKING SO SLOWLY THAT OTHER PEOPLE COULD HAVE NOTICED. OR THE OPPOSITE, BEING SO FIGETY OR RESTLESS THAT YOU HAVE BEEN MOVING AROUND A LOT MORE THAN USUAL: NOT AT ALL
3. TROUBLE FALLING OR STAYING ASLEEP: MORE THAN HALF THE DAYS
SUM OF ALL RESPONSES TO PHQ9 QUESTIONS 1 & 2: 5
10. IF YOU CHECKED OFF ANY PROBLEMS, HOW DIFFICULT HAVE THESE PROBLEMS MADE IT FOR YOU TO DO YOUR WORK, TAKE CARE OF THINGS AT HOME, OR GET ALONG WITH OTHER PEOPLE: VERY DIFFICULT
6. FEELING BAD ABOUT YOURSELF - OR THAT YOU ARE A FAILURE OR HAVE LET YOURSELF OR YOUR FAMILY DOWN: SEVERAL DAYS
SUM OF ALL RESPONSES TO PHQ QUESTIONS 1-9: 15
9. THOUGHTS THAT YOU WOULD BE BETTER OFF DEAD, OR OF HURTING YOURSELF: SEVERAL DAYS
4. FEELING TIRED OR HAVING LITTLE ENERGY: NEARLY EVERY DAY
5. POOR APPETITE OR OVEREATING: NOT AT ALL

## 2024-06-13 ASSESSMENT — ENCOUNTER SYMPTOMS
OCCASIONAL FEELINGS OF UNSTEADINESS: 0
COUGH: 1
FEVER: 0
LOSS OF SENSATION IN FEET: 0
RHINORRHEA: 0
DEPRESSION: 0

## 2024-06-13 ASSESSMENT — PAIN SCALES - GENERAL: PAINLEVEL: 0-NO PAIN

## 2024-06-13 ASSESSMENT — ANXIETY QUESTIONNAIRES
GAD7 TOTAL SCORE: 12
2. NOT BEING ABLE TO STOP OR CONTROL WORRYING: NEARLY EVERY DAY
6. BECOMING EASILY ANNOYED OR IRRITABLE: NEARLY EVERY DAY
7. FEELING AFRAID AS IF SOMETHING AWFUL MIGHT HAPPEN: SEVERAL DAYS
IF YOU CHECKED OFF ANY PROBLEMS ON THIS QUESTIONNAIRE, HOW DIFFICULT HAVE THESE PROBLEMS MADE IT FOR YOU TO DO YOUR WORK, TAKE CARE OF THINGS AT HOME, OR GET ALONG WITH OTHER PEOPLE: EXTREMELY DIFFICULT
5. BEING SO RESTLESS THAT IT IS HARD TO SIT STILL: SEVERAL DAYS
1. FEELING NERVOUS, ANXIOUS, OR ON EDGE: SEVERAL DAYS
4. TROUBLE RELAXING: NOT AT ALL
3. WORRYING TOO MUCH ABOUT DIFFERENT THINGS: NEARLY EVERY DAY

## 2024-06-13 ASSESSMENT — LIFESTYLE VARIABLES
AUDIT-C TOTAL SCORE: 0
HOW OFTEN DO YOU HAVE SIX OR MORE DRINKS ON ONE OCCASION: NEVER
SKIP TO QUESTIONS 9-10: 1
HOW MANY STANDARD DRINKS CONTAINING ALCOHOL DO YOU HAVE ON A TYPICAL DAY: PATIENT DOES NOT DRINK
HOW OFTEN DO YOU HAVE A DRINK CONTAINING ALCOHOL: NEVER

## 2024-06-13 NOTE — PROGRESS NOTES
Subjective   Patient ID: Kelsi Montejo is a 63 y.o. female who presents for Cough and Pain.  She was seen in urgent care at the end of May and dx with URI and prescribed medrol dose pack, doxycycline, and albuterol inhaler. Her fever has resolved but she continues to have a persistent cough, rib pain from coughing, and fatigue.     She is still having cognitive issues since having COVID. She has difficulty learning new information and gets overwhelmed if routines/processes at work changes. She has also noticed that she gets upset easily, irritable and angers easily also. She is frustrated with her lack of improvement since covid. She has intermittent abdominal pain which GI believes may be related to mood/anxiety.  She takes the lorazepam about two days per week. 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 She has found adaptations so that she can continue to work with her cognitive issues and this pharmacist interrupts that work flow.      pain joint pain and fibromyalgia   She has a history of fibromyalgia and polyarthralgia. She was seeing Dr. Garcias until he left his practice a few years ago. She is concerned about bilateral arm pain (particularly around elbows) and hand pain. Ortho though it might be tennis elbow in 2023 and referred her for PT but they felt that her pain was too widespread to be epicondylitis. In the past, I have treated her with a long and slow steroid taper which provide relief for several weeks. Last year, I prescribed Plaquenil. She was only able to take it for a few days due to it caused a wide spread itchy rash. It did seem to help with her chronic joint pain while she was on it. She is hesitant to see rheumatology again because she is scared to try new medication.       Cough  The current episode started 1 to 4 weeks ago. The problem occurs constantly. The cough is Non-productive. Pertinent negatives include no fever, nasal  congestion or rhinorrhea.       Patient Care Team:  Victoria Epstein DO as PCP - General  Daniel Vera MD as Consulting Physician (Hematology and Oncology)    Current Outpatient Medications   Medication Sig Dispense Refill    albuterol 90 mcg/actuation inhaler INHALE 2 PUFFS into the lungs EVERY 6 HOURS AS NEEDED for SHORTNESS OF BREATH, coughing, or wheezing      pantoprazole (ProtoNix) 40 mg EC tablet Take 1 tablet (40 mg) by mouth 2 times a day. 180 tablet 3    benzonatate (Tessalon) 200 mg capsule Take 1 capsule (200 mg) by mouth 3 times a day as needed for cough for up to 10 days. Do not crush or chew. 30 capsule 0    [START ON 6/16/2024] ergocalciferol (Vitamin D-2) 1.25 MG (43331 UT) capsule Take 1 capsule (1,250 mcg) by mouth 1 (one) time per week. 12 capsule 3    famotidine (Pepcid) 20 mg tablet Take by mouth once daily.      gabapentin (Neurontin) 300 mg capsule Take 1 capsule (300 mg) by mouth once daily at bedtime. 90 capsule 1    levocetirizine (Xyzal) 5 mg tablet Take 1 tablet (5 mg) by mouth once daily in the evening. 90 tablet 3    levoFLOXacin (Levaquin) 250 mg tablet Take 1 tablet (250 mg) by mouth once daily for 10 days. 10 tablet 0    levothyroxine (Synthroid, Levoxyl) 100 mcg tablet Take 1 tablet (100 mcg) by mouth once daily. 90 tablet 3    LORazepam (Ativan) 0.5 mg tablet TAKE 1 TABLET BY MOUTH ONCE DAILY AS NEEDED FOR ANXIETY for up to 14 days 14 tablet 0    predniSONE (Deltasone) 10 mg tablet Take 4 tablets (40 mg) by mouth once daily for 7 days, THEN 3 tablets (30 mg) once daily for 7 days, THEN 2 tablets (20 mg) once daily for 7 days, THEN 1 tablet (10 mg) once daily for 7 days. 70 tablet 0    zolpidem (Ambien) 10 mg tablet TAKE 1 TABLET BY MOUTH AT BEDTIME AS NEEDED 90 tablet 0     No current facility-administered medications for this visit.     OARRS:  Victoria Epstein DO on 6/13/2024  9:22 AM  I have personally reviewed the OARRS report for Kelsi Montejo. I have considered  the risks of abuse, dependence, addiction and diversion and I believe that it is clinically appropriate for Kelsi Montejo to be prescribed this medication    Is the patient prescribed a combination of a benzodiazepine and opioid?  No    Last Urine Drug Screen / ordered today: No  Recent Results (from the past 8760 hour(s))   Drug Screen, Urine With Reflex to Confirmation    Collection Time: 23  2:32 PM   Result Value Ref Range    Amphetamine Screen, Urine Presumptive Negative Presumptive Negative    Barbiturate Screen, Urine Presumptive Negative Presumptive Negative    Benzodiazepines Screen, Urine Presumptive Negative Presumptive Negative    Cannabinoid Screen, Urine Presumptive Negative Presumptive Negative    Cocaine Metabolite Screen, Urine Presumptive Negative Presumptive Negative    Fentanyl Screen, Urine Presumptive Negative Presumptive Negative    Opiate Screen, Urine Presumptive Negative Presumptive Negative    Oxycodone Screen, Urine Presumptive Negative Presumptive Negative    PCP Screen, Urine Presumptive Negative Presumptive Negative     Results are as expected.         Controlled Substance Agreement:  Date of the Last Agreement: 2023  Reviewed Controlled Substance Agreement including but not limited to the benefits, risks, and alternatives to treatment with a Controlled Substance medication(s).    Benzodiazepines:  What is the patient's goal of therapy? Control panic  Is this being achieved with current treatment? yes    ASHLIE-7:  Over the last 2 weeks, how often have you been bothered by any of the following problems?  Feeling nervous, anxious, or on edge: 1  Not being able to stop or control worrying: 3  Worrying too much about different things: 3  Trouble relaxin  Being so restless that it is hard to sit still: 1  Becoming easily annoyed or irritable: 3  Feeling afraid as if something awful might happen: 1  ASHLIE-7 Total Score: 12        Sleep Aids:   What is the patient's goal of  therapy? Better sleep quality  Is this being achieved with current treatment? yes      Objective     Visit Vitals  /81 (BP Location: Left arm, Patient Position: Sitting)   Pulse 86   Temp 36.2 °C (97.1 °F) (Skin)   Resp 16   Wt 93.4 kg (206 lb)   SpO2 93%   BMI 32.26 kg/m²   Smoking Status Former   BSA 2.1 m²        Constitutional: Well nourished, well developed, appears stated age  Eyes: no scleral icterus, no conjunctival injection  Ears: normal external auditory canal, no retraction or bulging of tympanic membranes  Neck: no thyromegaly  Cardiovascular: regular rate and rhythm, no leg edema  Respiratory: normal respiratory effort, rhonchi in all lung fields  Neurologic: Alert, CNs II-XII grossly intact..  Psych: Appropriate mood and affect.        Assessment/Plan   Problem List Items Addressed This Visit       Vitamin D deficiency - Primary    Relevant Medications    ergocalciferol (Vitamin D-2) 1.25 MG (59646 UT) capsule (Start on 6/16/2024)    Chronic insomnia (Chronic)     CSA on file 9/2023  UDS on file 11/2023  Continue zolpidem 10 mg          Relevant Medications    zolpidem (Ambien) 10 mg tablet    Hypothyroidism, adult    Relevant Medications    levothyroxine (Synthroid, Levoxyl) 100 mcg tablet    Polyarthralgia (Chronic)    Relevant Medications    predniSONE (Deltasone) 10 mg tablet    Generalized anxiety disorder with panic attacks (Chronic)     Continue lorazepam which she takes about twice per week  CSA and UDS on file          Relevant Medications    LORazepam (Ativan) 0.5 mg tablet    RLS (restless legs syndrome) (Chronic)     Takes gabapentin at bedtime prn, continue, refilled         Relevant Medications    gabapentin (Neurontin) 300 mg capsule    Pain in both upper extremities (Chronic)     Will do another round of prednisone          Relevant Medications    predniSONE (Deltasone) 10 mg tablet    COVID-19 long hauler manifesting chronic concentration deficit (Chronic)     Discussed possibly  trying Low dose naltrexone  Written information provided          Other Visit Diagnoses       Pruritus        Relevant Medications    levocetirizine (Xyzal) 5 mg tablet    Acute URI        Relevant Medications    levoFLOXacin (Levaquin) 250 mg tablet    benzonatate (Tessalon) 200 mg capsule          Follow up 3 months.     Victoria Epstein, DO

## 2024-06-13 NOTE — ASSESSMENT & PLAN NOTE
Continue lorazepam which she takes about twice per week  CSA and UDS on file    Gabapentin Counseling: I discussed with the patient the risks of gabapentin including but not limited to dizziness, somnolence, fatigue and ataxia.

## 2024-09-09 ENCOUNTER — LAB (OUTPATIENT)
Dept: LAB | Facility: LAB | Age: 64
End: 2024-09-09
Payer: COMMERCIAL

## 2024-09-09 DIAGNOSIS — D75.839 THROMBOCYTHEMIA: ICD-10-CM
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
## 2024-09-09 DIAGNOSIS — M25.50 POLYARTHRALGIA: Chronic | ICD-10-CM

## 2024-09-09 DIAGNOSIS — Z13.1 SCREENING FOR DIABETES MELLITUS: ICD-10-CM

## 2024-09-09 DIAGNOSIS — E03.9 HYPOTHYROIDISM, ADULT: ICD-10-CM

## 2024-09-09 DIAGNOSIS — Z11.3 SCREENING FOR STD (SEXUALLY TRANSMITTED DISEASE): ICD-10-CM

## 2024-09-09 LAB
ALBUMIN SERPL BCP-MCNC: 3.9 G/DL (ref 3.4–5)
ALP SERPL-CCNC: 102 U/L (ref 33–136)
ALT SERPL W P-5'-P-CCNC: 11 U/L (ref 7–45)
ANION GAP SERPL CALC-SCNC: 13 MMOL/L (ref 10–20)
AST SERPL W P-5'-P-CCNC: 15 U/L (ref 9–39)
BASOPHILS # BLD AUTO: 0.16 X10*3/UL (ref 0–0.1)
BASOPHILS NFR BLD AUTO: 1.3 %
BILIRUB SERPL-MCNC: 0.4 MG/DL (ref 0–1.2)
BUN SERPL-MCNC: 25 MG/DL (ref 6–23)
CALCIUM SERPL-MCNC: 8.7 MG/DL (ref 8.6–10.3)
CHLORIDE SERPL-SCNC: 103 MMOL/L (ref 98–107)
CO2 SERPL-SCNC: 26 MMOL/L (ref 21–32)
CREAT SERPL-MCNC: 0.79 MG/DL (ref 0.5–1.05)
CRP SERPL-MCNC: 0.87 MG/DL
EGFRCR SERPLBLD CKD-EPI 2021: 84 ML/MIN/1.73M*2
EOSINOPHIL # BLD AUTO: 0.19 X10*3/UL (ref 0–0.7)
EOSINOPHIL NFR BLD AUTO: 1.5 %
ERYTHROCYTE [DISTWIDTH] IN BLOOD BY AUTOMATED COUNT: 14.1 % (ref 11.5–14.5)
ERYTHROCYTE [SEDIMENTATION RATE] IN BLOOD BY WESTERGREN METHOD: 52 MM/H (ref 0–30)
EST. AVERAGE GLUCOSE BLD GHB EST-MCNC: 131 MG/DL
GLUCOSE SERPL-MCNC: 110 MG/DL (ref 74–99)
HBA1C MFR BLD: 6.2 %
HCT VFR BLD AUTO: 39.6 % (ref 36–46)
HGB BLD-MCNC: 12.8 G/DL (ref 12–16)
IMM GRANULOCYTES # BLD AUTO: 0.06 X10*3/UL (ref 0–0.7)
IMM GRANULOCYTES NFR BLD AUTO: 0.5 % (ref 0–0.9)
LYMPHOCYTES # BLD AUTO: 2.32 X10*3/UL (ref 1.2–4.8)
LYMPHOCYTES NFR BLD AUTO: 18.4 %
MCH RBC QN AUTO: 29.3 PG (ref 26–34)
MCHC RBC AUTO-ENTMCNC: 32.3 G/DL (ref 32–36)
MCV RBC AUTO: 91 FL (ref 80–100)
MONOCYTES # BLD AUTO: 1.23 X10*3/UL (ref 0.1–1)
MONOCYTES NFR BLD AUTO: 9.7 %
NEUTROPHILS # BLD AUTO: 8.68 X10*3/UL (ref 1.2–7.7)
NEUTROPHILS NFR BLD AUTO: 68.6 %
NRBC BLD-RTO: 0 /100 WBCS (ref 0–0)
PLATELET # BLD AUTO: 442 X10*3/UL (ref 150–450)
POTASSIUM SERPL-SCNC: 4.2 MMOL/L (ref 3.5–5.3)
PROT SERPL-MCNC: 7.4 G/DL (ref 6.4–8.2)
RBC # BLD AUTO: 4.37 X10*6/UL (ref 4–5.2)
SODIUM SERPL-SCNC: 138 MMOL/L (ref 136–145)
TSH SERPL-ACNC: 0.72 MIU/L (ref 0.44–3.98)
WBC # BLD AUTO: 12.6 X10*3/UL (ref 4.4–11.3)

## 2024-09-09 PROCEDURE — 83036 HEMOGLOBIN GLYCOSYLATED A1C: CPT

## 2024-09-09 PROCEDURE — 87476 LYME DIS DNA AMP PROBE: CPT

## 2024-09-09 PROCEDURE — 85652 RBC SED RATE AUTOMATED: CPT

## 2024-09-09 PROCEDURE — 86140 C-REACTIVE PROTEIN: CPT

## 2024-09-09 PROCEDURE — 36415 COLL VENOUS BLD VENIPUNCTURE: CPT

## 2024-09-09 PROCEDURE — 80053 COMPREHEN METABOLIC PANEL: CPT

## 2024-09-09 PROCEDURE — 86780 TREPONEMA PALLIDUM: CPT

## 2024-09-09 PROCEDURE — 84443 ASSAY THYROID STIM HORMONE: CPT

## 2024-09-09 PROCEDURE — 85025 COMPLETE CBC W/AUTO DIFF WBC: CPT

## 2024-09-10 LAB — TREPONEMA PALLIDUM IGG+IGM AB [PRESENCE] IN SERUM OR PLASMA BY IMMUNOASSAY: NONREACTIVE

## 2024-09-12 LAB
B BURGDOR DNA SPEC QL NAA+PROBE: NOT DETECTED
SPECIMEN SOURCE: NORMAL

## 2024-09-13 ENCOUNTER — APPOINTMENT (OUTPATIENT)
Dept: PRIMARY CARE | Facility: CLINIC | Age: 64
End: 2024-09-13
Payer: COMMERCIAL

## 2024-09-13 ENCOUNTER — PATIENT MESSAGE (OUTPATIENT)
Dept: PRIMARY CARE | Facility: CLINIC | Age: 64
End: 2024-09-13

## 2024-09-13 VITALS
RESPIRATION RATE: 19 BRPM | HEART RATE: 74 BPM | BODY MASS INDEX: 31.79 KG/M2 | SYSTOLIC BLOOD PRESSURE: 120 MMHG | WEIGHT: 203 LBS | OXYGEN SATURATION: 96 % | DIASTOLIC BLOOD PRESSURE: 81 MMHG | TEMPERATURE: 97.3 F

## 2024-09-13 DIAGNOSIS — Z78.0 ASYMPTOMATIC MENOPAUSE: ICD-10-CM

## 2024-09-13 DIAGNOSIS — R41.840 COVID-19 LONG HAULER MANIFESTING CHRONIC CONCENTRATION DEFICIT: Primary | Chronic | ICD-10-CM

## 2024-09-13 DIAGNOSIS — M25.50 POLYARTHRALGIA: Chronic | ICD-10-CM

## 2024-09-13 DIAGNOSIS — F41.0 GENERALIZED ANXIETY DISORDER WITH PANIC ATTACKS: Chronic | ICD-10-CM

## 2024-09-13 DIAGNOSIS — U09.9 COVID-19 LONG HAULER MANIFESTING CHRONIC CONCENTRATION DEFICIT: Primary | Chronic | ICD-10-CM

## 2024-09-13 DIAGNOSIS — M79.601 PAIN IN BOTH UPPER EXTREMITIES: ICD-10-CM

## 2024-09-13 DIAGNOSIS — E03.9 HYPOTHYROIDISM, ADULT: ICD-10-CM

## 2024-09-13 DIAGNOSIS — F41.1 GENERALIZED ANXIETY DISORDER WITH PANIC ATTACKS: Chronic | ICD-10-CM

## 2024-09-13 DIAGNOSIS — D75.839 THROMBOCYTHEMIA: ICD-10-CM

## 2024-09-13 DIAGNOSIS — F51.04 CHRONIC INSOMNIA: Chronic | ICD-10-CM

## 2024-09-13 DIAGNOSIS — M79.602 PAIN IN BOTH UPPER EXTREMITIES: ICD-10-CM

## 2024-09-13 PROCEDURE — 99214 OFFICE O/P EST MOD 30 MIN: CPT | Performed by: FAMILY MEDICINE

## 2024-09-13 PROCEDURE — 1036F TOBACCO NON-USER: CPT | Performed by: FAMILY MEDICINE

## 2024-09-13 RX ORDER — ZOLPIDEM TARTRATE 10 MG/1
TABLET ORAL
Qty: 90 TABLET | Refills: 0 | Status: SHIPPED | OUTPATIENT
Start: 2024-09-13

## 2024-09-13 RX ORDER — PREDNISONE 10 MG/1
TABLET ORAL
Qty: 70 TABLET | Refills: 0 | Status: SHIPPED | OUTPATIENT
Start: 2024-09-13

## 2024-09-13 SDOH — ECONOMIC STABILITY: FOOD INSECURITY: WITHIN THE PAST 12 MONTHS, YOU WORRIED THAT YOUR FOOD WOULD RUN OUT BEFORE YOU GOT MONEY TO BUY MORE.: NEVER TRUE

## 2024-09-13 SDOH — ECONOMIC STABILITY: FOOD INSECURITY: WITHIN THE PAST 12 MONTHS, THE FOOD YOU BOUGHT JUST DIDN'T LAST AND YOU DIDN'T HAVE MONEY TO GET MORE.: NEVER TRUE

## 2024-09-13 ASSESSMENT — LIFESTYLE VARIABLES
HOW OFTEN DO YOU HAVE SIX OR MORE DRINKS ON ONE OCCASION: NEVER
HOW MANY STANDARD DRINKS CONTAINING ALCOHOL DO YOU HAVE ON A TYPICAL DAY: PATIENT DOES NOT DRINK
HOW OFTEN DO YOU HAVE A DRINK CONTAINING ALCOHOL: NEVER
SKIP TO QUESTIONS 9-10: 1
AUDIT-C TOTAL SCORE: 0

## 2024-09-13 ASSESSMENT — PATIENT HEALTH QUESTIONNAIRE - PHQ9
2. FEELING DOWN, DEPRESSED OR HOPELESS: SEVERAL DAYS
SUM OF ALL RESPONSES TO PHQ9 QUESTIONS 1 & 2: 4
1. LITTLE INTEREST OR PLEASURE IN DOING THINGS: NEARLY EVERY DAY
3. TROUBLE FALLING OR STAYING ASLEEP: NEARLY EVERY DAY

## 2024-09-13 ASSESSMENT — PAIN SCALES - GENERAL: PAINLEVEL: 5

## 2024-09-13 NOTE — PROGRESS NOTES
Subjective   Patient ID: Kelsi Montejo is a 63 y.o. female who presents for Follow-up (Pain, anxiety, insomnia ).  She is still having cognitive issues since having COVID. She has difficulty learning new information and gets overwhelmed if routines/processes at work changes. She has also noticed that she gets upset easily, irritable and angers easily also. She reports being mean at times. She is frustrated with her lack of improvement since covid. She has intermittent abdominal pain which GI believes may be related to mood/anxiety.  She takes the lorazepam about two days per week. 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 She has found adaptations so that she can continue to work with her cognitive issues and this pharmacist interrupts that work flow.      pain joint pain and fibromyalgia   She has a history of fibromyalgia and polyarthralgia. She is concerned about bilateral arm pain (particularly around elbows) and hand pain. Ortho though it might be tennis elbow in 2023 and referred her for PT but they felt that her pain was too widespread to be epicondylitis. In the past, I have treated her with a long and slow steroid taper which provide relief for several weeks. In 2023 I prescribed Plaquenil. She was only able to take it for a few days due to it caused a wide spread itchy rash. It did seem to help with her chronic joint pain while she was on it. She is hesitant to see rheumatology again because she is scared to try new medication. At her last visit, we discussed a possible trial of LDN to help with both long covid and her chronic pain. She is willing to try this today. She has a history of anterior cervical fusion at C6-C7.     She had labs done and is here for review.           Patient Care Team:  Victoria Epstein DO as PCP - General  Daniel Vera MD as Consulting Physician (Hematology and Oncology)    Current Outpatient Medications    Medication Sig Dispense Refill    albuterol 90 mcg/actuation inhaler INHALE 2 PUFFS into the lungs EVERY 6 HOURS AS NEEDED for SHORTNESS OF BREATH, coughing, or wheezing      ergocalciferol (Vitamin D-2) 1.25 MG (62248 UT) capsule Take 1 capsule (1,250 mcg) by mouth 1 (one) time per week. 12 capsule 3    famotidine (Pepcid) 20 mg tablet Take by mouth once daily.      gabapentin (Neurontin) 300 mg capsule Take 1 capsule (300 mg) by mouth once daily at bedtime. 90 capsule 1    levocetirizine (Xyzal) 5 mg tablet Take 1 tablet (5 mg) by mouth once daily in the evening. 90 tablet 3    levothyroxine (Synthroid, Levoxyl) 100 mcg tablet Take 1 tablet (100 mcg) by mouth once daily. 90 tablet 3    LORazepam (Ativan) 0.5 mg tablet TAKE 1 TABLET BY MOUTH ONCE DAILY AS NEEDED FOR ANXIETY for up to 14 days 14 tablet 0    pantoprazole (ProtoNix) 40 mg EC tablet Take 1 tablet (40 mg) by mouth 2 times a day. 180 tablet 3    naltrexone 1.5 mg capsule Take 1 capsule by mouth once daily at bedtime. 30 capsule 0    predniSONE (Deltasone) 10 mg tablet Take 4 tablets (40 mg) by mouth once daily for 7 days, THEN 3 tablets (30 mg) once daily for 7 days, THEN 2 tablets (20 mg) once daily for 7 days, THEN 1 tablet (10 mg) once daily for 7 days. 70 tablet 0    zolpidem (Ambien) 10 mg tablet TAKE 1 TABLET BY MOUTH AT BEDTIME AS NEEDED 90 tablet 0     No current facility-administered medications for this visit.       Objective     Visit Vitals  /81 (BP Location: Right arm, Patient Position: Sitting)   Pulse 74   Temp 36.3 °C (97.3 °F) (Temporal)   Resp 19   Wt 92.1 kg (203 lb)   SpO2 96%   BMI 31.79 kg/m²   Smoking Status Former   BSA 2.09 m²        Constitutional: Well nourished, well developed, appears stated age  Eyes: no scleral icterus, no conjunctival injection  Respiratory: normal respiratory effort  Musculoskeletal: No gross deformities appreciated  Skin: Warm, dry.  Neurologic: Alert, CNs II-XII grossly intact..  Psych:  Appropriate mood and affect.        Assessment & Plan  COVID-19 long hauler manifesting chronic concentration deficit  Will start her on LDN 1.5 mg at bedtime, plan to increase to 3 mg after 4 week if no improvement in symptoms     Orders:    naltrexone 1.5 mg capsule; Take 1 capsule by mouth once daily at bedtime.    Sedimentation Rate; Future    Chronic insomnia  CSA on file 9/2023  UDS on file 11/2023  Continue zolpidem 10 mg     Orders:    zolpidem (Ambien) 10 mg tablet; TAKE 1 TABLET BY MOUTH AT BEDTIME AS NEEDED    Generalized anxiety disorder with panic attacks  Continue lorazepam which she takes about twice per week  CSA and UDS on file          Asymptomatic menopause    Orders:    XR DEXA bone density; Future    Hypothyroidism, adult    Orders:    TSH; Future    Thrombocythemia  Currently resolved  Orders:    CBC and Auto Differential; Future    Polyarthralgia    Orders:    predniSONE (Deltasone) 10 mg tablet; Take 4 tablets (40 mg) by mouth once daily for 7 days, THEN 3 tablets (30 mg) once daily for 7 days, THEN 2 tablets (20 mg) once daily for 7 days, THEN 1 tablet (10 mg) once daily for 7 days.    Pain in both upper extremities    Orders:    predniSONE (Deltasone) 10 mg tablet; Take 4 tablets (40 mg) by mouth once daily for 7 days, THEN 3 tablets (30 mg) once daily for 7 days, THEN 2 tablets (20 mg) once daily for 7 days, THEN 1 tablet (10 mg) once daily for 7 days.    XR cervical spine complete 4-5 views; Future       All pertinent lab work and results were reviewed with patient.     Follow up 3 months.     Victoria Epstein DO

## 2024-09-13 NOTE — ASSESSMENT & PLAN NOTE
CSA on file 9/2023  UDS on file 11/2023  Continue zolpidem 10 mg     Orders:    zolpidem (Ambien) 10 mg tablet; TAKE 1 TABLET BY MOUTH AT BEDTIME AS NEEDED

## 2024-09-13 NOTE — ASSESSMENT & PLAN NOTE
Will start her on LDN 1.5 mg at bedtime, plan to increase to 3 mg after 4 week if no improvement in symptoms     Orders:    naltrexone 1.5 mg capsule; Take 1 capsule by mouth once daily at bedtime.    Sedimentation Rate; Future

## 2024-09-13 NOTE — ASSESSMENT & PLAN NOTE
Orders:    predniSONE (Deltasone) 10 mg tablet; Take 4 tablets (40 mg) by mouth once daily for 7 days, THEN 3 tablets (30 mg) once daily for 7 days, THEN 2 tablets (20 mg) once daily for 7 days, THEN 1 tablet (10 mg) once daily for 7 days.    XR cervical spine complete 4-5 views; Future

## 2024-09-13 NOTE — ASSESSMENT & PLAN NOTE
Orders:    predniSONE (Deltasone) 10 mg tablet; Take 4 tablets (40 mg) by mouth once daily for 7 days, THEN 3 tablets (30 mg) once daily for 7 days, THEN 2 tablets (20 mg) once daily for 7 days, THEN 1 tablet (10 mg) once daily for 7 days.

## 2024-09-13 NOTE — PATIENT INSTRUCTIONS
Thank you for choosing [facility] for your healthcare.   As always if you have any questions or concerns please do not hesitate to call our office at [phone] or through [employer].    Have a great day!  Victoria Epstein, DO

## 2024-10-13 ENCOUNTER — PATIENT MESSAGE (OUTPATIENT)
Dept: PRIMARY CARE | Facility: CLINIC | Age: 64
End: 2024-10-13
Payer: COMMERCIAL

## 2024-10-13 DIAGNOSIS — U09.9 COVID-19 LONG HAULER MANIFESTING CHRONIC CONCENTRATION DEFICIT: Chronic | ICD-10-CM

## 2024-10-13 DIAGNOSIS — R41.840 COVID-19 LONG HAULER MANIFESTING CHRONIC CONCENTRATION DEFICIT: Chronic | ICD-10-CM

## 2024-10-16 DIAGNOSIS — F41.0 GENERALIZED ANXIETY DISORDER WITH PANIC ATTACKS: ICD-10-CM

## 2024-10-16 DIAGNOSIS — F41.1 GENERALIZED ANXIETY DISORDER WITH PANIC ATTACKS: ICD-10-CM

## 2024-10-16 RX ORDER — LORAZEPAM 0.5 MG/1
TABLET ORAL
Qty: 14 TABLET | Refills: 0 | Status: SHIPPED | OUTPATIENT
Start: 2024-10-16

## 2024-10-22 DIAGNOSIS — Z12.31 ENCOUNTER FOR SCREENING MAMMOGRAM FOR BREAST CANCER: ICD-10-CM

## 2024-10-25 ENCOUNTER — APPOINTMENT (OUTPATIENT)
Dept: RADIOLOGY | Facility: CLINIC | Age: 64
End: 2024-10-25
Payer: COMMERCIAL

## 2024-11-08 ENCOUNTER — PATIENT MESSAGE (OUTPATIENT)
Dept: PRIMARY CARE | Facility: CLINIC | Age: 64
End: 2024-11-08
Payer: COMMERCIAL

## 2024-11-17 ENCOUNTER — APPOINTMENT (OUTPATIENT)
Dept: CARDIOLOGY | Facility: HOSPITAL | Age: 64
End: 2024-11-17
Payer: COMMERCIAL

## 2024-11-17 ENCOUNTER — ANCILLARY PROCEDURE (OUTPATIENT)
Dept: URGENT CARE | Age: 64
End: 2024-11-17
Payer: COMMERCIAL

## 2024-11-17 ENCOUNTER — HOSPITAL ENCOUNTER (EMERGENCY)
Facility: HOSPITAL | Age: 64
Discharge: HOME | End: 2024-11-17
Attending: STUDENT IN AN ORGANIZED HEALTH CARE EDUCATION/TRAINING PROGRAM
Payer: COMMERCIAL

## 2024-11-17 ENCOUNTER — OFFICE VISIT (OUTPATIENT)
Dept: URGENT CARE | Age: 64
End: 2024-11-17
Payer: COMMERCIAL

## 2024-11-17 VITALS
WEIGHT: 205 LBS | HEIGHT: 65 IN | BODY MASS INDEX: 34.16 KG/M2 | OXYGEN SATURATION: 95 % | RESPIRATION RATE: 20 BRPM | DIASTOLIC BLOOD PRESSURE: 81 MMHG | HEART RATE: 67 BPM | SYSTOLIC BLOOD PRESSURE: 134 MMHG | TEMPERATURE: 98.4 F

## 2024-11-17 VITALS
TEMPERATURE: 99.3 F | OXYGEN SATURATION: 97 % | HEART RATE: 88 BPM | SYSTOLIC BLOOD PRESSURE: 145 MMHG | DIASTOLIC BLOOD PRESSURE: 98 MMHG | RESPIRATION RATE: 20 BRPM

## 2024-11-17 DIAGNOSIS — R06.02 SHORTNESS OF BREATH: Primary | ICD-10-CM

## 2024-11-17 DIAGNOSIS — R06.02 SHORTNESS OF BREATH: ICD-10-CM

## 2024-11-17 LAB
ANION GAP SERPL CALC-SCNC: 15 MMOL/L (ref 10–20)
BASOPHILS # BLD AUTO: 0.08 X10*3/UL (ref 0–0.1)
BASOPHILS NFR BLD AUTO: 0.8 %
BNP SERPL-MCNC: 14 PG/ML (ref 0–99)
BUN SERPL-MCNC: 20 MG/DL (ref 6–23)
CALCIUM SERPL-MCNC: 9.1 MG/DL (ref 8.6–10.3)
CARDIAC TROPONIN I PNL SERPL HS: 4 NG/L (ref 0–13)
CHLORIDE SERPL-SCNC: 103 MMOL/L (ref 98–107)
CO2 SERPL-SCNC: 25 MMOL/L (ref 21–32)
CREAT SERPL-MCNC: 1.1 MG/DL (ref 0.5–1.05)
D DIMER PPP FEU-MCNC: 304 NG/ML FEU
EGFRCR SERPLBLD CKD-EPI 2021: 57 ML/MIN/1.73M*2
EOSINOPHIL # BLD AUTO: 0.22 X10*3/UL (ref 0–0.7)
EOSINOPHIL NFR BLD AUTO: 2.1 %
ERYTHROCYTE [DISTWIDTH] IN BLOOD BY AUTOMATED COUNT: 15.5 % (ref 11.5–14.5)
GLUCOSE SERPL-MCNC: 109 MG/DL (ref 74–99)
HCT VFR BLD AUTO: 41.8 % (ref 36–46)
HGB BLD-MCNC: 13.9 G/DL (ref 12–16)
IMM GRANULOCYTES # BLD AUTO: 0.03 X10*3/UL (ref 0–0.7)
IMM GRANULOCYTES NFR BLD AUTO: 0.3 % (ref 0–0.9)
LYMPHOCYTES # BLD AUTO: 2.83 X10*3/UL (ref 1.2–4.8)
LYMPHOCYTES NFR BLD AUTO: 27 %
MCH RBC QN AUTO: 29.7 PG (ref 26–34)
MCHC RBC AUTO-ENTMCNC: 33.3 G/DL (ref 32–36)
MCV RBC AUTO: 89 FL (ref 80–100)
MONOCYTES # BLD AUTO: 0.83 X10*3/UL (ref 0.1–1)
MONOCYTES NFR BLD AUTO: 7.9 %
NEUTROPHILS # BLD AUTO: 6.51 X10*3/UL (ref 1.2–7.7)
NEUTROPHILS NFR BLD AUTO: 61.9 %
NRBC BLD-RTO: 0 /100 WBCS (ref 0–0)
PLATELET # BLD AUTO: 391 X10*3/UL (ref 150–450)
POTASSIUM SERPL-SCNC: 4 MMOL/L (ref 3.5–5.3)
RBC # BLD AUTO: 4.68 X10*6/UL (ref 4–5.2)
SODIUM SERPL-SCNC: 139 MMOL/L (ref 136–145)
WBC # BLD AUTO: 10.5 X10*3/UL (ref 4.4–11.3)

## 2024-11-17 PROCEDURE — 85025 COMPLETE CBC W/AUTO DIFF WBC: CPT | Performed by: STUDENT IN AN ORGANIZED HEALTH CARE EDUCATION/TRAINING PROGRAM

## 2024-11-17 PROCEDURE — 80048 BASIC METABOLIC PNL TOTAL CA: CPT | Performed by: STUDENT IN AN ORGANIZED HEALTH CARE EDUCATION/TRAINING PROGRAM

## 2024-11-17 PROCEDURE — 83880 ASSAY OF NATRIURETIC PEPTIDE: CPT | Performed by: STUDENT IN AN ORGANIZED HEALTH CARE EDUCATION/TRAINING PROGRAM

## 2024-11-17 PROCEDURE — 93005 ELECTROCARDIOGRAM TRACING: CPT

## 2024-11-17 PROCEDURE — 99204 OFFICE O/P NEW MOD 45 MIN: CPT

## 2024-11-17 PROCEDURE — 71046 X-RAY EXAM CHEST 2 VIEWS: CPT

## 2024-11-17 PROCEDURE — 84484 ASSAY OF TROPONIN QUANT: CPT | Performed by: STUDENT IN AN ORGANIZED HEALTH CARE EDUCATION/TRAINING PROGRAM

## 2024-11-17 PROCEDURE — 99283 EMERGENCY DEPT VISIT LOW MDM: CPT

## 2024-11-17 PROCEDURE — 85379 FIBRIN DEGRADATION QUANT: CPT | Performed by: STUDENT IN AN ORGANIZED HEALTH CARE EDUCATION/TRAINING PROGRAM

## 2024-11-17 PROCEDURE — 93000 ELECTROCARDIOGRAM COMPLETE: CPT

## 2024-11-17 PROCEDURE — 36415 COLL VENOUS BLD VENIPUNCTURE: CPT | Performed by: STUDENT IN AN ORGANIZED HEALTH CARE EDUCATION/TRAINING PROGRAM

## 2024-11-17 ASSESSMENT — LIFESTYLE VARIABLES
TOTAL SCORE: 0
EVER HAD A DRINK FIRST THING IN THE MORNING TO STEADY YOUR NERVES TO GET RID OF A HANGOVER: NO
HAVE YOU EVER FELT YOU SHOULD CUT DOWN ON YOUR DRINKING: NO
HAVE PEOPLE ANNOYED YOU BY CRITICIZING YOUR DRINKING: NO
EVER FELT BAD OR GUILTY ABOUT YOUR DRINKING: NO

## 2024-11-17 ASSESSMENT — PAIN SCALES - GENERAL
PAINLEVEL_OUTOF10: 0 - NO PAIN
PAINLEVEL_OUTOF10: 0 - NO PAIN

## 2024-11-17 ASSESSMENT — COLUMBIA-SUICIDE SEVERITY RATING SCALE - C-SSRS
6. HAVE YOU EVER DONE ANYTHING, STARTED TO DO ANYTHING, OR PREPARED TO DO ANYTHING TO END YOUR LIFE?: NO
1. IN THE PAST MONTH, HAVE YOU WISHED YOU WERE DEAD OR WISHED YOU COULD GO TO SLEEP AND NOT WAKE UP?: NO
2. HAVE YOU ACTUALLY HAD ANY THOUGHTS OF KILLING YOURSELF?: NO

## 2024-11-17 ASSESSMENT — PAIN - FUNCTIONAL ASSESSMENT: PAIN_FUNCTIONAL_ASSESSMENT: 0-10

## 2024-11-17 ASSESSMENT — ENCOUNTER SYMPTOMS: SHORTNESS OF BREATH: 1

## 2024-11-17 NOTE — Clinical Note
Kelsi Montejo was seen and treated in our emergency department on 11/17/2024.  She may return to work on 11/20/2024.       If you have any questions or concerns, please don't hesitate to call.      Trung Cleveland, DO

## 2024-11-17 NOTE — PROGRESS NOTES
Subjective   Patient ID: Kelsi Montejo is a 63 y.o. female. They present today with a chief complaint of Shortness of Breath (1.5-2 weeks, worsens when walking, ).    History of Present Illness  63-year-old female presents to clinic today with complaints of shortness of breath.  Patient states has been ongoing for about 1 and half to 2 weeks.  She states she has noticed it slightly worse with walking.  She denies any chest pain.  She denies cough.  Denies fever.  Denies body aches or chills.  Denies any new or changing medications.  Denies significant cardiac history.  She states she did have 1 blood clot when she had COVID a couple years ago.  Denies any recent long travel.  While patient was hospitalized she also had an episode of A-fib and a flutter.  Patient states she has been monitoring her oxygen level at home and has dipped between 90% after movement.      Shortness of Breath      Past Medical History  Allergies as of 11/17/2024 - Reviewed 11/17/2024   Allergen Reaction Noted    Plaquenil [hydroxychloroquine] Rash 01/15/2024       (Not in a hospital admission)       Past Medical History:   Diagnosis Date    Atrial fibrillation with RVR (Multi) 03/04/2023    Clostridium difficile diarrhea 09/01/2023    Cystocele with rectocele 03/04/2023    Effusion, right elbow 03/20/2020    Swelling of right elbow    Encounter for follow-up examination after completed treatment for conditions other than malignant neoplasm 08/14/2018    Postop check    Gout attack 03/04/2023    Iron deficiency anemia 03/04/2023    Low back pain 03/04/2023    Neck pain 03/04/2023    Other conditions influencing health status 06/19/2014    No significant past medical history    Pain in left toe(s) 09/15/2020    Pain of toe of left foot    Pelvic floor weakness 03/04/2023    Personal history of diseases of the skin and subcutaneous tissue 07/21/2018    History of cellulitis    Personal history of other specified conditions 06/05/2018     History of urinary frequency    Post-menopause atrophic vaginitis 03/04/2023    Vaginal vault prolapse, posthysterectomy 03/04/2023       Past Surgical History:   Procedure Laterality Date    BREAST SURGERY  06/19/2014    Breast Surgery Reduction Procedure    GALLBLADDER SURGERY  06/19/2014    Gallbladder Surgery    HYSTERECTOMY  06/19/2014    Hysterectomy    KNEE SURGERY  06/19/2014    Knee Surgery    LYMPHADENECTOMY  06/19/2014    Lymphadenectomy    OTHER SURGICAL HISTORY  06/19/2014    Trachectomy    OTHER SURGICAL HISTORY  07/21/2018    Vaginal Surgery Colpopexy Abdominal Approach    OTHER SURGICAL HISTORY  07/21/2018    Mid-Urethral Sling Operation    ROTATOR CUFF REPAIR  06/19/2014    Rotator Cuff Repair        reports that she quit smoking about 2 years ago. Her smoking use included cigarettes. She has never used smokeless tobacco. She reports that she does not currently use alcohol. She reports that she does not use drugs.    Review of Systems  Review of Systems   Respiratory:  Positive for shortness of breath.                                   Objective    Vitals:    11/17/24 1614   BP: (!) 145/98   Pulse: 88   Resp: 20   Temp: 37.4 °C (99.3 °F)   SpO2: 97%     No LMP recorded.    Physical Exam  Cardiovascular:      Rate and Rhythm: Normal rate and regular rhythm.   Pulmonary:      Breath sounds: Normal breath sounds.      Comments: Increased effort for respirations  Neurological:      Mental Status: She is alert.         Procedures    Point of Care Test & Imaging Results from this visit  No results found for this visit on 11/17/24.   No results found.    Diagnostic study results (if any) were reviewed by Gaurang Clay PA-C.    Assessment/Plan   Allergies, medications, history, and pertinent labs/EKGs/Imaging reviewed by Gaurang Clay PA-C.     Medical Decision Making  Cardiopulmonary exam within normal limits.  Vital signs are stable.  Originally on examination patient was working to get of breath.   She states that that has been the case over the last couple weeks.  She states when she rests for long periods her breathing improves.  Chest x-ray and EKG was completed and did not reveal significant abnormality.  With patient's significant history including a blood clot I am worried for possible PE versus other cardiac/pulmonary etiology.  I advised patient she should go to the emergency room for further eval.  Patient is agreement with plan and going to [place] [facility].    Orders and Diagnoses  There are no diagnoses linked to this encounter.    Medical Admin Record      Patient disposition: ED    Electronically signed by Gaurang Clay PA-C  4:27 PM

## 2024-11-17 NOTE — ED PROVIDER NOTES
HPI   Chief Complaint   Patient presents with    Shortness of Breath     Seen at  had XR-found to be negative.        63-year-old female with past medical history of A-fib, prior VTE not on anticoagulation presenting to ED with shortness of breath.  Patient's been having shortness of breath for the past week.  Has worsening shortness of breath with exertion.  No worsening with lying flat.  Denies chest pain.  No cough.  No leg swelling.  Denies any fevers or chills.  Denies recent surgery, immobilization or active cancer.  No prior history of asthma or COPD.              Patient History   Past Medical History:   Diagnosis Date    Atrial fibrillation with RVR (Multi) 03/04/2023    Clostridium difficile diarrhea 09/01/2023    Cystocele with rectocele 03/04/2023    Effusion, right elbow 03/20/2020    Swelling of right elbow    Encounter for follow-up examination after completed treatment for conditions other than malignant neoplasm 08/14/2018    Postop check    Gout attack 03/04/2023    Iron deficiency anemia 03/04/2023    Low back pain 03/04/2023    Neck pain 03/04/2023    Other conditions influencing health status 06/19/2014    No significant past medical history    Pain in left toe(s) 09/15/2020    Pain of toe of left foot    Pelvic floor weakness 03/04/2023    Personal history of diseases of the skin and subcutaneous tissue 07/21/2018    History of cellulitis    Personal history of other specified conditions 06/05/2018    History of urinary frequency    Post-menopause atrophic vaginitis 03/04/2023    Vaginal vault prolapse, posthysterectomy 03/04/2023     Past Surgical History:   Procedure Laterality Date    BREAST SURGERY  06/19/2014    Breast Surgery Reduction Procedure    GALLBLADDER SURGERY  06/19/2014    Gallbladder Surgery    HYSTERECTOMY  06/19/2014    Hysterectomy    KNEE SURGERY  06/19/2014    Knee Surgery    LYMPHADENECTOMY  06/19/2014    Lymphadenectomy    OTHER SURGICAL HISTORY  06/19/2014     Trachectomy    OTHER SURGICAL HISTORY  2018    Vaginal Surgery Colpopexy Abdominal Approach    OTHER SURGICAL HISTORY  2018    Mid-Urethral Sling Operation    ROTATOR CUFF REPAIR  2014    Rotator Cuff Repair     Family History   Problem Relation Name Age of Onset    No Known Problems Mother      No Known Problems Father      Other (cardiac disease) Other      Hypertension Other       Social History     Tobacco Use    Smoking status: Former     Current packs/day: 0.00     Types: Cigarettes     Quit date:      Years since quittin.8    Smokeless tobacco: Never   Vaping Use    Vaping status: Never Used   Substance Use Topics    Alcohol use: Not Currently    Drug use: Never       Physical Exam   ED Triage Vitals [24 1757]   Temperature Heart Rate Respirations BP   36.9 °C (98.4 °F) 76 20 (!) 171/116      Pulse Ox Temp Source Heart Rate Source Patient Position   98 % Temporal -- --      BP Location FiO2 (%)     -- --       Physical Exam  Vitals and nursing note reviewed.   Constitutional:       General: She is not in acute distress.     Appearance: She is well-developed.   HENT:      Head: Normocephalic and atraumatic.   Eyes:      Conjunctiva/sclera: Conjunctivae normal.   Cardiovascular:      Rate and Rhythm: Normal rate and regular rhythm.      Heart sounds: No murmur heard.  Pulmonary:      Effort: Pulmonary effort is normal. No respiratory distress.      Breath sounds: Normal breath sounds.   Abdominal:      Palpations: Abdomen is soft.      Tenderness: There is no abdominal tenderness.   Musculoskeletal:         General: No swelling.      Cervical back: Neck supple.      Right lower leg: No tenderness. No edema.      Left lower leg: No tenderness. No edema.   Skin:     General: Skin is warm and dry.      Capillary Refill: Capillary refill takes less than 2 seconds.   Neurological:      Mental Status: She is alert.   Psychiatric:         Mood and Affect: Mood normal.           ED  Course & MDM   ED Course as of 11/17/24 1935   Sun Nov 17, 2024   1827 EKG shows sinus rhythm.  No STEMI.  Prolonged QTc at 614.  Normal axis. [RS]      ED Course User Index  [RS] Trung Cleveland DO         Diagnoses as of 11/17/24 1935   Shortness of breath                 No data recorded     [place] Coma Scale Score: 15 (11/17/24 1758 : Vaibhav Francis RN)                           Medical Decision Making  HISTORIAN:  Patient    CHART REVIEW:  I was able to review the chest x-ray that is done at the urgent care just prior to arrival.  It was negative.    PT SUMMARY:  63-year-old female presents ED with shortness of breath and generalized weakness.  Vital signs stable.    DDX:  ACS, PE, PNA, COPD, CHF, Anema, Pericardial effusion, Asthma      PLAN:  Will obtain CBC, BMP, EKG, troponin, BNP, D-dimer    DISPO/RE-EVAL:  Patient's labs show no acute abnormalities.  EKG shows no new ischemic changes.  Patient already had a chest x-ray done at the urgent care which was negative.  I did perform a point-of-care ultrasound of the heart and I did not see any significant pericardial effusion that could be causing her symptoms.  Since her workup is negative I believe she stable to be discharged home.  Vital signs stable as well.  However follow-up with her primary care doctor soon as possible.  Advised to come back to the ED for any new or worsening symptoms.          Procedure  Procedures     Trung Cleveland DO  11/17/24 1936

## 2024-11-18 LAB
ATRIAL RATE: 77 BPM
P AXIS: 26 DEGREES
PR INTERVAL: 191 MS
Q ONSET: 251 MS
QRS COUNT: 13 BEATS
QRS DURATION: 96 MS
QT INTERVAL: 540 MS
QTC CALCULATION(BAZETT): 612 MS
QTC FREDERICIA: 586 MS
R AXIS: -16 DEGREES
T AXIS: 127 DEGREES
T OFFSET: 521 MS
VENTRICULAR RATE: 77 BPM

## 2024-11-18 NOTE — DISCHARGE INSTRUCTIONS
Follow-up with your primary care doctor soon as possible.  Come back to ED for any new or worsening symptoms.

## 2024-12-09 ENCOUNTER — LAB (OUTPATIENT)
Dept: LAB | Facility: LAB | Age: 64
End: 2024-12-09
Payer: COMMERCIAL

## 2024-12-09 DIAGNOSIS — R41.840 COVID-19 LONG HAULER MANIFESTING CHRONIC CONCENTRATION DEFICIT: Chronic | ICD-10-CM

## 2024-12-09 DIAGNOSIS — U09.9 COVID-19 LONG HAULER MANIFESTING CHRONIC CONCENTRATION DEFICIT: Chronic | ICD-10-CM

## 2024-12-09 DIAGNOSIS — E03.9 HYPOTHYROIDISM, ADULT: ICD-10-CM

## 2024-12-09 DIAGNOSIS — D75.839 THROMBOCYTHEMIA: ICD-10-CM

## 2024-12-09 DIAGNOSIS — R94.4 DECREASED GFR: ICD-10-CM

## 2024-12-09 DIAGNOSIS — R63.5 WEIGHT GAIN: ICD-10-CM

## 2024-12-09 LAB
ANION GAP SERPL CALC-SCNC: 13 MMOL/L (ref 10–20)
BASOPHILS # BLD AUTO: 0.11 X10*3/UL (ref 0–0.1)
BASOPHILS NFR BLD AUTO: 1.2 %
BUN SERPL-MCNC: 25 MG/DL (ref 6–23)
CALCIUM SERPL-MCNC: 9.1 MG/DL (ref 8.6–10.3)
CHLORIDE SERPL-SCNC: 102 MMOL/L (ref 98–107)
CO2 SERPL-SCNC: 28 MMOL/L (ref 21–32)
CREAT SERPL-MCNC: 1.19 MG/DL (ref 0.5–1.05)
EGFRCR SERPLBLD CKD-EPI 2021: 51 ML/MIN/1.73M*2
EOSINOPHIL # BLD AUTO: 0.4 X10*3/UL (ref 0–0.7)
EOSINOPHIL NFR BLD AUTO: 4.5 %
ERYTHROCYTE [DISTWIDTH] IN BLOOD BY AUTOMATED COUNT: 16.7 % (ref 11.5–14.5)
ERYTHROCYTE [SEDIMENTATION RATE] IN BLOOD BY WESTERGREN METHOD: 8 MM/H (ref 0–30)
GLUCOSE SERPL-MCNC: 110 MG/DL (ref 74–99)
HCT VFR BLD AUTO: 42.6 % (ref 36–46)
HGB BLD-MCNC: 13.8 G/DL (ref 12–16)
IMM GRANULOCYTES # BLD AUTO: 0.04 X10*3/UL (ref 0–0.7)
IMM GRANULOCYTES NFR BLD AUTO: 0.4 % (ref 0–0.9)
INSULIN P FAST SERPL-ACNC: 21 UIU/ML (ref 3–25)
LYMPHOCYTES # BLD AUTO: 2.61 X10*3/UL (ref 1.2–4.8)
LYMPHOCYTES NFR BLD AUTO: 29.1 %
MCH RBC QN AUTO: 30.3 PG (ref 26–34)
MCHC RBC AUTO-ENTMCNC: 32.4 G/DL (ref 32–36)
MCV RBC AUTO: 93 FL (ref 80–100)
MONOCYTES # BLD AUTO: 0.61 X10*3/UL (ref 0.1–1)
MONOCYTES NFR BLD AUTO: 6.8 %
NEUTROPHILS # BLD AUTO: 5.19 X10*3/UL (ref 1.2–7.7)
NEUTROPHILS NFR BLD AUTO: 58 %
NRBC BLD-RTO: 0 /100 WBCS (ref 0–0)
PLATELET # BLD AUTO: 419 X10*3/UL (ref 150–450)
POTASSIUM SERPL-SCNC: 4.2 MMOL/L (ref 3.5–5.3)
RBC # BLD AUTO: 4.56 X10*6/UL (ref 4–5.2)
SODIUM SERPL-SCNC: 139 MMOL/L (ref 136–145)
WBC # BLD AUTO: 9 X10*3/UL (ref 4.4–11.3)

## 2024-12-09 PROCEDURE — 84439 ASSAY OF FREE THYROXINE: CPT

## 2024-12-09 PROCEDURE — 85025 COMPLETE CBC W/AUTO DIFF WBC: CPT

## 2024-12-09 PROCEDURE — 80048 BASIC METABOLIC PNL TOTAL CA: CPT

## 2024-12-09 PROCEDURE — 84443 ASSAY THYROID STIM HORMONE: CPT

## 2024-12-09 PROCEDURE — 83525 ASSAY OF INSULIN: CPT

## 2024-12-09 PROCEDURE — 85652 RBC SED RATE AUTOMATED: CPT

## 2024-12-09 PROCEDURE — 36415 COLL VENOUS BLD VENIPUNCTURE: CPT

## 2024-12-11 NOTE — PATIENT INSTRUCTIONS
Thank you for choosing [facility] for your healthcare.   As always if you have any questions or concerns please do not hesitate to call our office at [phone] or through [employer].    Have a great day!  Victoria Epstein, DO

## 2024-12-12 ENCOUNTER — APPOINTMENT (OUTPATIENT)
Dept: PRIMARY CARE | Facility: CLINIC | Age: 64
End: 2024-12-12
Payer: COMMERCIAL

## 2024-12-12 VITALS
HEART RATE: 78 BPM | DIASTOLIC BLOOD PRESSURE: 84 MMHG | OXYGEN SATURATION: 92 % | HEIGHT: 65 IN | WEIGHT: 203 LBS | TEMPERATURE: 96.8 F | SYSTOLIC BLOOD PRESSURE: 132 MMHG | BODY MASS INDEX: 33.82 KG/M2 | RESPIRATION RATE: 20 BRPM

## 2024-12-12 DIAGNOSIS — Z79.899 MEDICATION MANAGEMENT: ICD-10-CM

## 2024-12-12 DIAGNOSIS — R94.4 DECREASED GFR: ICD-10-CM

## 2024-12-12 DIAGNOSIS — F51.04 CHRONIC INSOMNIA: Chronic | ICD-10-CM

## 2024-12-12 DIAGNOSIS — R06.81 WITNESSED APNEIC SPELLS: ICD-10-CM

## 2024-12-12 DIAGNOSIS — J06.9 ACUTE URI: ICD-10-CM

## 2024-12-12 DIAGNOSIS — R06.02 SHORTNESS OF BREATH: Primary | ICD-10-CM

## 2024-12-12 DIAGNOSIS — E03.9 HYPOTHYROIDISM, ADULT: ICD-10-CM

## 2024-12-12 LAB
T4 FREE SERPL-MCNC: <0.2 NG/DL (ref 0.78–1.48)
TSH SERPL-ACNC: >120 MIU/L (ref 0.44–3.98)

## 2024-12-12 PROCEDURE — 3008F BODY MASS INDEX DOCD: CPT | Performed by: FAMILY MEDICINE

## 2024-12-12 PROCEDURE — 99214 OFFICE O/P EST MOD 30 MIN: CPT | Performed by: FAMILY MEDICINE

## 2024-12-12 PROCEDURE — 82570 ASSAY OF URINE CREATININE: CPT

## 2024-12-12 PROCEDURE — 82043 UR ALBUMIN QUANTITATIVE: CPT

## 2024-12-12 PROCEDURE — 1036F TOBACCO NON-USER: CPT | Performed by: FAMILY MEDICINE

## 2024-12-12 PROCEDURE — 80307 DRUG TEST PRSMV CHEM ANLYZR: CPT

## 2024-12-12 RX ORDER — ZOLPIDEM TARTRATE 10 MG/1
TABLET ORAL
Qty: 90 TABLET | Refills: 0 | Status: SHIPPED | OUTPATIENT
Start: 2024-12-12

## 2024-12-12 RX ORDER — DOXYCYCLINE 100 MG/1
100 CAPSULE ORAL 2 TIMES DAILY
Qty: 14 CAPSULE | Refills: 0 | Status: SHIPPED | OUTPATIENT
Start: 2024-12-12 | End: 2024-12-19

## 2024-12-12 SDOH — ECONOMIC STABILITY: FOOD INSECURITY: WITHIN THE PAST 12 MONTHS, YOU WORRIED THAT YOUR FOOD WOULD RUN OUT BEFORE YOU GOT MONEY TO BUY MORE.: NEVER TRUE

## 2024-12-12 SDOH — ECONOMIC STABILITY: FOOD INSECURITY: WITHIN THE PAST 12 MONTHS, THE FOOD YOU BOUGHT JUST DIDN'T LAST AND YOU DIDN'T HAVE MONEY TO GET MORE.: NEVER TRUE

## 2024-12-12 ASSESSMENT — ENCOUNTER SYMPTOMS
OCCASIONAL FEELINGS OF UNSTEADINESS: 0
DEPRESSION: 1
LOSS OF SENSATION IN FEET: 1

## 2024-12-12 ASSESSMENT — PATIENT HEALTH QUESTIONNAIRE - PHQ9
SUM OF ALL RESPONSES TO PHQ9 QUESTIONS 1 & 2: 2
1. LITTLE INTEREST OR PLEASURE IN DOING THINGS: SEVERAL DAYS
10. IF YOU CHECKED OFF ANY PROBLEMS, HOW DIFFICULT HAVE THESE PROBLEMS MADE IT FOR YOU TO DO YOUR WORK, TAKE CARE OF THINGS AT HOME, OR GET ALONG WITH OTHER PEOPLE: SOMEWHAT DIFFICULT
2. FEELING DOWN, DEPRESSED OR HOPELESS: SEVERAL DAYS

## 2024-12-12 ASSESSMENT — LIFESTYLE VARIABLES
AUDIT-C TOTAL SCORE: 0
SKIP TO QUESTIONS 9-10: 1
HOW OFTEN DO YOU HAVE A DRINK CONTAINING ALCOHOL: NEVER
HOW MANY STANDARD DRINKS CONTAINING ALCOHOL DO YOU HAVE ON A TYPICAL DAY: PATIENT DOES NOT DRINK
HOW OFTEN DO YOU HAVE SIX OR MORE DRINKS ON ONE OCCASION: NEVER

## 2024-12-12 ASSESSMENT — PAIN SCALES - GENERAL: PAINLEVEL_OUTOF10: 0-NO PAIN

## 2024-12-12 NOTE — ASSESSMENT & PLAN NOTE
She stopped levothyroxine a few months ago   Orders:    TSH with reflex to Free T4 if abnormal; Future

## 2024-12-12 NOTE — ASSESSMENT & PLAN NOTE
CSA and UDS updated today 12/2024  Continue Zolpidem  Orders:    zolpidem (Ambien) 10 mg tablet; TAKE 1 TABLET BY MOUTH AT BEDTIME AS NEEDED

## 2024-12-12 NOTE — PROGRESS NOTES
Subjective   Patient ID: Kelsi Montejo is a 63 y.o. female who presents for Anxiety and Insomnia (CSA/UDS due ).  She is still having cognitive issues since having COVID. She has difficulty learning new information and gets overwhelmed if routines/processes at work changes. She has also noticed that she gets upset easily, irritable and angers easily also. She reports being mean at times. She is frustrated with her lack of improvement since covid. She has intermittent abdominal pain which GI believes may be related to mood/anxiety.  She was taking  the lorazepam about two days per week, now she is taking it less often.      pain joint pain and fibromyalgia   She has a history of fibromyalgia and polyarthralgia. Her chronic bilateral arm pain (particularly around elbows) and hand pain is better recently than it has been in the past. In the past, I have treated her with a long and slow steroid taper which provide relief for several weeks. In 2023 I prescribed Plaquenil. She was only able to take it for a few days due to it caused a wide spread itchy rash. It did seem to help with her chronic joint pain while she was on it. She is hesitant to see rheumatology again because she is scared to try new medication. At her last visit, I started her on LDN. WE increased the dose to 3 mg in October and she ended up discontinuing it 11/8/24 due to nausea. She has a history of anterior cervical fusion at C6-C7.      She had labs done and is here for review.     She is concerned about dyspnea with exertion and fatigue. She has been monitoring her pulse ox and it ranges from 91-95% usually though sometimes it is 97%. She denies cough or dysphagia. She does not hoarseness and reports the feeling of sinus pressure for several weeks though she is not having drainage. Her  states that her snoring has been louder over the past few weeks and she sometimes pauses breathing.           Patient Care Team:  Victoria Epstein, DO as  PCP - General  Daniel Vera MD as Consulting Physician (Hematology and Oncology)     Current Outpatient Medications   Medication Sig Dispense Refill    ergocalciferol (Vitamin D-2) 1.25 MG (22816 UT) capsule Take 1 capsule (1,250 mcg) by mouth 1 (one) time per week. 12 capsule 3    gabapentin (Neurontin) 300 mg capsule Take 1 capsule (300 mg) by mouth once daily at bedtime. 90 capsule 1    levocetirizine (Xyzal) 5 mg tablet Take 1 tablet (5 mg) by mouth once daily in the evening. 90 tablet 3    LORazepam (Ativan) 0.5 mg tablet TAKE 1 TABLET BY MOUTH ONCE DAILY AS NEEDED FOR ANXIETY for up to 14 days 14 tablet 0    doxycycline (Vibramycin) 100 mg capsule Take 1 capsule (100 mg) by mouth 2 times a day for 7 days. Take with at least 8 ounces (large glass) of water, do not lie down for 30 minutes after 14 capsule 0    predniSONE (Deltasone) 10 mg tablet Take 4 tablets (40 mg) by mouth once daily for 7 days, THEN 3 tablets (30 mg) once daily for 7 days, THEN 2 tablets (20 mg) once daily for 7 days, THEN 1 tablet (10 mg) once daily for 7 days. (Patient not taking: Reported on 12/12/2024) 70 tablet 0    zolpidem (Ambien) 10 mg tablet TAKE 1 TABLET BY MOUTH AT BEDTIME AS NEEDED 90 tablet 0     No current facility-administered medications for this visit.       OARRS:  Victoria Epstein DO on 12/12/2024 11:16 AM  I have personally reviewed the OARRS report for Kelsi Montejo. I have considered the risks of abuse, dependence, addiction and diversion and I believe that it is clinically appropriate for Kelsi Montejo to be prescribed this medication    Is the patient prescribed a combination of a benzodiazepine and opioid?  No    Last Urine Drug Screen / ordered today: Yes  No results found for this or any previous visit (from the past 8760 hours).  N/A    Controlled Substance Agreement:  Date of the Last Agreement: 12/2024  Reviewed Controlled Substance Agreement including but not limited to the benefits, risks, and  "alternatives to treatment with a Controlled Substance medication(s).    Benzodiazepines:  What is the patient's goal of therapy? Control panic  Is this being achieved with current treatment? ye      Activities of Daily Living:   Is your overall impression that this patient is benefiting (symptom reduction outweighs side effects) from benzodiazepine therapy? Yes     Objective     Visit Vitals  /84 (BP Location: Right arm, Patient Position: Sitting, BP Cuff Size: Adult)   Pulse 78   Temp 36 °C (96.8 °F) (Temporal)   Resp 20   Ht 1.651 m (5' 5\")   Wt 92.1 kg (203 lb)   SpO2 92%   BMI 33.78 kg/m²   Smoking Status Former   BSA 2.06 m²        Constitutional: Well nourished, well developed, appears stated age  Eyes: no scleral icterus, no conjunctival injection  Ears: normal external auditory canal, no retraction or bulging of tympanic membranes  Neck: no thyromegaly  Cardiovascular: regular rate and rhythm, no leg edema  Respiratory: normal respiratory effort, clear to auscultation bilaterally  Neurologic: Alert, CNs II-XII grossly intact..  Psych: Appropriate mood and affect.       Assessment & Plan  Shortness of breath    Orders:    CT chest wo IV contrast; Future    Referral to Pulmonology; Future    Chronic insomnia  CSA and UDS updated today 12/2024  Continue Zolpidem  Orders:    zolpidem (Ambien) 10 mg tablet; TAKE 1 TABLET BY MOUTH AT BEDTIME AS NEEDED    Medication management    Orders:    Drug Screen, Urine With Reflex to Confirmation    Decreased GFR  Unclear etiology  Recheck in 1-2 weeks   Orders:    Albumin-Creatinine Ratio, Urine Random    Basic metabolic panel; Future    Sedimentation Rate; Future    Acute URI    Orders:    doxycycline (Vibramycin) 100 mg capsule; Take 1 capsule (100 mg) by mouth 2 times a day for 7 days. Take with at least 8 ounces (large glass) of water, do not lie down for 30 minutes after    Hypothyroidism, adult  She stopped levothyroxine a few months ago   Orders:    TSH with " reflex to Free T4 if abnormal; Future    Witnessed apneic spells  Plan on sleep study in future, states that she would not want CPAP if positive.           Follow up 3 months due to controlled substance.    Victoria Epstein, DO

## 2024-12-13 LAB
AMPHETAMINES UR QL SCN: NORMAL
BARBITURATES UR QL SCN: NORMAL
BENZODIAZ UR QL SCN: NORMAL
BZE UR QL SCN: NORMAL
CANNABINOIDS UR QL SCN: NORMAL
CREAT UR-MCNC: 102.2 MG/DL (ref 20–320)
FENTANYL+NORFENTANYL UR QL SCN: NORMAL
METHADONE UR QL SCN: NORMAL
MICROALBUMIN UR-MCNC: <7 MG/L
MICROALBUMIN/CREAT UR: NORMAL MG/G{CREAT}
OPIATES UR QL SCN: NORMAL
OXYCODONE+OXYMORPHONE UR QL SCN: NORMAL
PCP UR QL SCN: NORMAL

## 2024-12-15 ENCOUNTER — PATIENT MESSAGE (OUTPATIENT)
Dept: PRIMARY CARE | Facility: CLINIC | Age: 64
End: 2024-12-15
Payer: COMMERCIAL

## 2024-12-15 DIAGNOSIS — J06.9 ACUTE URI: ICD-10-CM

## 2024-12-18 RX ORDER — DOXYCYCLINE 100 MG/1
100 CAPSULE ORAL 2 TIMES DAILY
Qty: 14 CAPSULE | Refills: 0 | Status: SHIPPED | OUTPATIENT
Start: 2024-12-18 | End: 2024-12-25

## 2024-12-30 ENCOUNTER — HOSPITAL ENCOUNTER (OUTPATIENT)
Dept: RADIOLOGY | Facility: CLINIC | Age: 64
Discharge: HOME | End: 2024-12-30
Payer: COMMERCIAL

## 2024-12-30 DIAGNOSIS — M79.601 PAIN IN BOTH UPPER EXTREMITIES: ICD-10-CM

## 2024-12-30 DIAGNOSIS — R06.02 SHORTNESS OF BREATH: ICD-10-CM

## 2024-12-30 DIAGNOSIS — M79.602 PAIN IN BOTH UPPER EXTREMITIES: ICD-10-CM

## 2024-12-30 PROCEDURE — 71250 CT THORAX DX C-: CPT | Performed by: RADIOLOGY

## 2024-12-30 PROCEDURE — 71250 CT THORAX DX C-: CPT

## 2024-12-30 PROCEDURE — 72050 X-RAY EXAM NECK SPINE 4/5VWS: CPT | Performed by: STUDENT IN AN ORGANIZED HEALTH CARE EDUCATION/TRAINING PROGRAM

## 2024-12-30 PROCEDURE — 72050 X-RAY EXAM NECK SPINE 4/5VWS: CPT

## 2025-01-24 ENCOUNTER — OFFICE VISIT (OUTPATIENT)
Dept: PULMONOLOGY | Facility: HOSPITAL | Age: 65
End: 2025-01-24
Payer: COMMERCIAL

## 2025-01-24 VITALS
BODY MASS INDEX: 33.27 KG/M2 | RESPIRATION RATE: 16 BRPM | DIASTOLIC BLOOD PRESSURE: 74 MMHG | TEMPERATURE: 97.8 F | WEIGHT: 207 LBS | HEIGHT: 66 IN | SYSTOLIC BLOOD PRESSURE: 130 MMHG | OXYGEN SATURATION: 96 % | HEART RATE: 73 BPM

## 2025-01-24 DIAGNOSIS — J30.89 PERENNIAL ALLERGIC RHINITIS WITH SEASONAL VARIATION: ICD-10-CM

## 2025-01-24 DIAGNOSIS — J30.2 PERENNIAL ALLERGIC RHINITIS WITH SEASONAL VARIATION: ICD-10-CM

## 2025-01-24 DIAGNOSIS — R07.89 MUSCULOSKELETAL CHEST PAIN: ICD-10-CM

## 2025-01-24 DIAGNOSIS — G47.10 HYPERSOMNIA WITH SLEEP APNEA: ICD-10-CM

## 2025-01-24 DIAGNOSIS — K21.9 GASTROESOPHAGEAL REFLUX DISEASE WITHOUT ESOPHAGITIS: ICD-10-CM

## 2025-01-24 DIAGNOSIS — G47.30 HYPERSOMNIA WITH SLEEP APNEA: ICD-10-CM

## 2025-01-24 DIAGNOSIS — R06.02 SHORTNESS OF BREATH: Primary | ICD-10-CM

## 2025-01-24 PROCEDURE — 99214 OFFICE O/P EST MOD 30 MIN: CPT | Performed by: INTERNAL MEDICINE

## 2025-01-24 PROCEDURE — 3008F BODY MASS INDEX DOCD: CPT | Performed by: INTERNAL MEDICINE

## 2025-01-24 PROCEDURE — 99204 OFFICE O/P NEW MOD 45 MIN: CPT | Performed by: INTERNAL MEDICINE

## 2025-01-24 PROCEDURE — 1036F TOBACCO NON-USER: CPT | Performed by: INTERNAL MEDICINE

## 2025-01-24 RX ORDER — FLUTICASONE PROPIONATE 50 MCG
2 SPRAY, SUSPENSION (ML) NASAL DAILY
Qty: 16 G | Refills: 6 | Status: SHIPPED | OUTPATIENT
Start: 2025-01-24 | End: 2025-07-23

## 2025-01-24 RX ORDER — ALBUTEROL SULFATE 0.83 MG/ML
3 SOLUTION RESPIRATORY (INHALATION) ONCE
OUTPATIENT
Start: 2025-01-24 | End: 2025-01-24

## 2025-01-24 RX ORDER — FAMOTIDINE 40 MG/1
40 TABLET, FILM COATED ORAL NIGHTLY
Qty: 30 TABLET | Refills: 5 | Status: SHIPPED | OUTPATIENT
Start: 2025-01-24

## 2025-01-24 RX ORDER — ALBUTEROL SULFATE 90 UG/1
4 INHALANT RESPIRATORY (INHALATION) ONCE
OUTPATIENT
Start: 2025-01-24

## 2025-01-24 RX ORDER — CETIRIZINE HYDROCHLORIDE 10 MG/1
10 TABLET ORAL DAILY
Qty: 30 TABLET | Refills: 11 | Status: SHIPPED | OUTPATIENT
Start: 2025-01-24

## 2025-01-24 RX ORDER — ALBUTEROL SULFATE 90 UG/1
2 INHALANT RESPIRATORY (INHALATION) EVERY 4 HOURS PRN
Qty: 8.5 G | Refills: 11 | Status: SHIPPED | OUTPATIENT
Start: 2025-01-24 | End: 2026-01-24

## 2025-01-24 ASSESSMENT — ENCOUNTER SYMPTOMS
CHEST TIGHTNESS: 1
SHORTNESS OF BREATH: 1

## 2025-01-24 NOTE — PATIENT INSTRUCTIONS
Please complete home sleep study and PFTs (lung test).  Take albuterol inhaler 2 puffs as needed for shortness of breath. May take every 4hrs as needed for shortness of breath.   Start taking cetirizine 1 tablet daily in the evening.  Start fluticasone nasal spray in each nostril at bedtime.  Take famotidine 1 tablet daily at bedtime. Avoid spicy food, excessive caffeine or alcoholic beverages.  Blood test for environmental allergies with next blood draw.  Follow up on April 9th, 25.

## 2025-01-24 NOTE — PROGRESS NOTES
Subjective   Patient ID: Kelsi Montejo is a 64 y.o. female who presents for Shortness of Breath.    Shortness of Breath  Ms. Montejo is a 63 yo female referred for evaluation for shortness of breath and discuss recent CT chest showing bibasilar mild scarring. Patient states she has had recurrent bronchitis and pneumonia as a child. She needed emergency trach at age of 5 but did not find any specific pathology in her airway or lungs. She has been very anxious about her breathing issues especially since she had Covid in June 2022. She reports she has been told to have long covid and she has noted memory issues with that. She does note being emotional lately since her symptoms have recurred recently. She was hospitalized with ARDS Severe pneumonia due to Covid and Pneumococcal at [facility] in June 2022 and later transferred to [facility]. She recovered gradually and has not had consistent issues with SOB with activity but gets sinus issues frequently. She is not on home O2. She went to ER Nov 17th for SOB and hypoxia. She states that she was having SOB for 7 to 10 days prior to coming to the ER. She states that her POX was running in 80s. She states she kept feeling that she was having sinus infection. She will feel nasal congestion 'cloggy' and had postnasal drip. She got antibiotics for 2 weeks which helped. She still feels that she has shortness of breath with bending over or doing daily household chores. 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 She states that when she laughs or cries she gets pains in the right flank and epigastric area.   She notes having GERD but stopped PPI but takes Tums. She also notes significant fatigue and excessive daytime sleepiness. She wakes up choking and snorting, unrefreshed and  has noted snoring. She started smoking at age of 16 then quit for 10 years but smoked consistently approx 32 years x 1 ppd.     Review of Systems   Respiratory:  Positive for chest  tightness and shortness of breath.    All other systems reviewed and are negative.      Objective   Physical Exam  Vitals and nursing note reviewed.   Constitutional:       Appearance: Normal appearance.   HENT:      Head: Normocephalic.      Nose: Nose normal.      Mouth/Throat:      Pharynx: Oropharynx is clear.   Eyes:      Extraocular Movements: Extraocular movements intact.      Conjunctiva/sclera: Conjunctivae normal.      Pupils: Pupils are equal, round, and reactive to light.   Cardiovascular:      Rate and Rhythm: Normal rate and regular rhythm.      Pulses: Normal pulses.      Heart sounds: Normal heart sounds.   Pulmonary:      Effort: Pulmonary effort is normal.      Breath sounds: Normal breath sounds.      Comments: Right lower chest MSK tenderness in posterior axillary line.  Abdominal:      General: Bowel sounds are normal.      Palpations: Abdomen is soft.   Musculoskeletal:         General: Normal range of motion.      Cervical back: Normal range of motion.   Skin:     General: Skin is warm.   Neurological:      General: No focal deficit present.      Mental Status: She is alert and oriented to person, place, and time. Mental status is at baseline.   Psychiatric:         Mood and Affect: Mood normal.         Behavior: Behavior normal.       Assessment/Plan   Shortness of breath  Hypersomnia with sleep apnea  Chronic allergic rhintiis  Rt sided MSK pain with tenderness lateral lower chest  GERD    Recommendations:  Patient provides history of recurrent bronchitis in childhood and uncontrolled recurrent allergic rhinitis symptoms that raise concern for possible underlying Asthma. She has also smoked about 32 years x 1 ppd which raises concern for COPD. Currently she does not have symptoms of chronic bronchitis and her CT chest did not show obvious emphysema. However, on my personal review I did notice some changes of bronchitis which could be related to possible ACOS versus postCovid especially when  you include mild scarring residual on CT chest. There is no significant finding otherwise on CT chest reported such as lung nodule. Provided reassurance to patient and discussed to evaluate further from ACOS standpoint. In the meanwhile also recommend work up for Sleep Disordered Breathing.     -check PFTs  -take albuterol inhaler prn  -check Ig E and RAST  -optimize rx for allergic rhinitis with cetirizine and flonase  -GERD rx with famotidine HS and PPI prn. Monitor renal functions. Avoid spicy foods and caffeine.  -Topical analgesic for MSK pain. States biofreeze helps.  -Recommend HST asap. High probability of DARSHANA. Large neck, BMI 33.4, Upper airway narrowing with Mallampati class IV, significant symptoms to suggest SDB, STOPBANG 6. Will start APAP if HST is positive.    Follow up after PFTs, HST, Lab work and assess therapeutic response to prescriptions. Advised to notify if any worsening prior to next visit.

## 2025-01-31 LAB
ANION GAP SERPL CALCULATED.4IONS-SCNC: 9 MMOL/L (CALC) (ref 7–17)
BUN SERPL-MCNC: 17 MG/DL (ref 7–25)
BUN/CREAT SERPL: NORMAL (CALC) (ref 6–22)
CALCIUM SERPL-MCNC: 8.7 MG/DL (ref 8.6–10.4)
CHLORIDE SERPL-SCNC: 104 MMOL/L (ref 98–110)
CO2 SERPL-SCNC: 26 MMOL/L (ref 20–32)
CREAT SERPL-MCNC: 0.79 MG/DL (ref 0.5–1.05)
EGFRCR SERPLBLD CKD-EPI 2021: 83 ML/MIN/1.73M2
ERYTHROCYTE [SEDIMENTATION RATE] IN BLOOD BY WESTERGREN METHOD: 39 MM/H
GLUCOSE SERPL-MCNC: 83 MG/DL (ref 65–139)
POTASSIUM SERPL-SCNC: 4.1 MMOL/L (ref 3.5–5.3)
SODIUM SERPL-SCNC: 139 MMOL/L (ref 135–146)

## 2025-02-01 LAB
A ALTERNATA IGE QN: <0.1 KU/L
A ALTERNATA IGE RAST: 0
A FUMIGATUS IGE QN: <0.1 KU/L
A FUMIGATUS IGE RAST: 0
BERMUDA GRASS IGE QN: <0.1 KU/L
BERMUDA GRASS IGE RAST: 0
BOXELDER IGE QN: <0.1 KU/L
BOXELDER IGE RAST: 0
C HERBARUM IGE QN: <0.1 KU/L
C HERBARUM IGE RAST: 0
CALIF WALNUT POLN IGE QN: <0.1 KU/L
CALIF WALNUT POLN IGE RAST: 0
CAT (RFEL D) 1 IGE QN: 0.15 KU/L
CAT (RFEL D) 4 IGE QN: <0.1 KU/L
CAT (RFEL D) 7 IGE QN: <0.1 KU/L
CAT DANDER IGE QN: 0.19 KU/L
CAT DANDER IGE RAST: ABNORMAL
CMN PIGWEED IGE QN: <0.1 KU/L
CMN PIGWEED IGE RAST: 0
COMMON RAGWEED IGE QN: <0.1 KU/L
COMMON RAGWEED IGE RAST: 0
COTTONWOOD IGE QN: <0.1 KU/L
COTTONWOOD IGE RAST: 0
D FARINAE IGE QN: <0.1 KU/L
D FARINAE IGE RAST: 0
D PTERONYSS IGE QN: <0.1 KU/L
D PTERONYSS IGE RAST: 0
DOG DANDER IGE QN: <0.1 KU/L
DOG DANDER IGE RAST: 0
IGE SERPL-ACNC: 19 KU/L
LONDON PLANE IGE QN: <0.1 KU/L
LONDON PLANE IGE RAST: 0
MOUSE URINE PROT IGE QN: <0.1 KU/L
MOUSE URINE PROT IGE RAST: 0
MT JUNIPER IGE QN: <0.1 KU/L
MT JUNIPER IGE RAST: 0
P NOTATUM IGE QN: <0.1 KU/L
P NOTATUM IGE RAST: 0
PECAN/HICK TREE IGE QN: <0.1 KU/L
PECAN/HICK TREE IGE RAST: 0
QUEST CAT DANDER COMP PNL FEL D 2 (E220) IGE: <0.1 KU/L
REF LAB TEST REF RANGE: NORMAL
ROACH IGE QN: <0.1 KU/L
ROACH IGE RAST: 0
SALTWORT IGE QN: <0.1 KU/L
SALTWORT IGE RAST: 0
SHEEP SORREL IGE QN: <0.1 KU/L
SHEEP SORREL IGE RAST: 0
SILVER BIRCH IGE QN: <0.1 KU/L
SILVER BIRCH IGE RAST: 0
TIMOTHY IGE QN: <0.1 KU/L
TIMOTHY IGE RAST: 0
WHITE ASH IGE QN: <0.1 KU/L
WHITE ASH IGE RAST: 0
WHITE ELM IGE QN: <0.1 KU/L
WHITE ELM IGE RAST: 0
WHITE MULBERRY IGE QN: <0.1 KU/L
WHITE MULBERRY IGE RAST: 0
WHITE OAK IGE QN: <0.1 KU/L
WHITE OAK IGE RAST: 0

## 2025-02-11 ENCOUNTER — APPOINTMENT (OUTPATIENT)
Dept: RESPIRATORY THERAPY | Facility: HOSPITAL | Age: 65
End: 2025-02-11
Payer: COMMERCIAL

## 2025-02-17 ENCOUNTER — APPOINTMENT (OUTPATIENT)
Dept: SLEEP MEDICINE | Facility: CLINIC | Age: 65
End: 2025-02-17
Payer: COMMERCIAL

## 2025-02-21 ENCOUNTER — TELEPHONE (OUTPATIENT)
Dept: PULMONOLOGY | Facility: HOSPITAL | Age: 65
End: 2025-02-21
Payer: COMMERCIAL

## 2025-02-21 NOTE — TELEPHONE ENCOUNTER
Called and spoke with patient regarding results. Instructed her to call us back with any further questions or concerns. Patient was agreeable to treatment plan and acknowledged understanding.     ----- Message from Richmond Santiago sent at 2/21/2025 12:46 PM EST -----  Cat allergy is noted on allergy test. Avoidance to cat danders will help sinus and bronchial symptoms.

## 2025-02-27 ENCOUNTER — HOSPITAL ENCOUNTER (OUTPATIENT)
Dept: RESPIRATORY THERAPY | Facility: HOSPITAL | Age: 65
Discharge: HOME | End: 2025-02-27
Payer: COMMERCIAL

## 2025-02-27 DIAGNOSIS — R06.02 SHORTNESS OF BREATH: ICD-10-CM

## 2025-02-27 PROCEDURE — 2500000001 HC RX 250 WO HCPCS SELF ADMINISTERED DRUGS (ALT 637 FOR MEDICARE OP): Performed by: INTERNAL MEDICINE

## 2025-02-27 PROCEDURE — 94640 AIRWAY INHALATION TREATMENT: CPT

## 2025-02-27 PROCEDURE — 94726 PLETHYSMOGRAPHY LUNG VOLUMES: CPT

## 2025-02-27 RX ORDER — ALBUTEROL SULFATE 90 UG/1
4 INHALANT RESPIRATORY (INHALATION) ONCE
Status: COMPLETED | OUTPATIENT
Start: 2025-02-27 | End: 2025-02-27

## 2025-02-27 RX ORDER — ALBUTEROL SULFATE 0.83 MG/ML
3 SOLUTION RESPIRATORY (INHALATION) ONCE
Status: COMPLETED | OUTPATIENT
Start: 2025-02-27 | End: 2025-02-27

## 2025-02-27 RX ADMIN — ALBUTEROL SULFATE 4 PUFF: 90 AEROSOL, METERED RESPIRATORY (INHALATION) at 11:33

## 2025-02-28 LAB
MGC ASCENT PFT - FEV1 - PRE: 2.4
MGC ASCENT PFT - FEV1 - PREDICTED: 2.36
MGC ASCENT PFT - FVC - POST: 2.57
MGC ASCENT PFT - FVC - PRE: 2.78
MGC ASCENT PFT - FVC - PREDICTED: 3

## 2025-03-05 DIAGNOSIS — E03.9 HYPOTHYROIDISM, ADULT: ICD-10-CM

## 2025-03-05 RX ORDER — LEVOTHYROXINE SODIUM 100 UG/1
100 TABLET ORAL DAILY
Qty: 90 TABLET | Refills: 3 | Status: SHIPPED | OUTPATIENT
Start: 2025-03-05

## 2025-03-05 RX ORDER — LEVOTHYROXINE SODIUM 100 UG/1
100 TABLET ORAL DAILY
COMMUNITY
End: 2025-03-05 | Stop reason: SDUPTHER

## 2025-03-10 DIAGNOSIS — F51.04 CHRONIC INSOMNIA: Chronic | ICD-10-CM

## 2025-03-10 RX ORDER — ZOLPIDEM TARTRATE 10 MG/1
TABLET ORAL
Qty: 90 TABLET | Refills: 0 | Status: SHIPPED | OUTPATIENT
Start: 2025-03-10

## 2025-03-12 NOTE — PATIENT INSTRUCTIONS
Thank you for choosing [facility] for your healthcare.   As always if you have any questions or concerns please do not hesitate to call our office at [phone] or through [employer].    Have a great day!  Victoria Epstein, DO

## 2025-03-14 ENCOUNTER — APPOINTMENT (OUTPATIENT)
Dept: PULMONOLOGY | Facility: HOSPITAL | Age: 65
End: 2025-03-14
Payer: COMMERCIAL

## 2025-03-14 ENCOUNTER — APPOINTMENT (OUTPATIENT)
Dept: PRIMARY CARE | Facility: CLINIC | Age: 65
End: 2025-03-14
Payer: COMMERCIAL

## 2025-03-14 VITALS
SYSTOLIC BLOOD PRESSURE: 111 MMHG | BODY MASS INDEX: 33.11 KG/M2 | TEMPERATURE: 96.8 F | HEART RATE: 68 BPM | DIASTOLIC BLOOD PRESSURE: 74 MMHG | OXYGEN SATURATION: 97 % | HEIGHT: 66 IN | WEIGHT: 206 LBS | RESPIRATION RATE: 20 BRPM

## 2025-03-14 DIAGNOSIS — U09.9 COVID-19 LONG HAULER MANIFESTING CHRONIC CONCENTRATION DEFICIT: Chronic | ICD-10-CM

## 2025-03-14 DIAGNOSIS — F51.04 CHRONIC INSOMNIA: Chronic | ICD-10-CM

## 2025-03-14 DIAGNOSIS — R41.840 COVID-19 LONG HAULER MANIFESTING CHRONIC CONCENTRATION DEFICIT: Chronic | ICD-10-CM

## 2025-03-14 DIAGNOSIS — M79.601 PAIN IN BOTH UPPER EXTREMITIES: ICD-10-CM

## 2025-03-14 DIAGNOSIS — M54.12 CERVICAL RADICULOPATHY AT C5: Primary | ICD-10-CM

## 2025-03-14 DIAGNOSIS — M79.602 PAIN IN BOTH UPPER EXTREMITIES: ICD-10-CM

## 2025-03-14 DIAGNOSIS — G25.81 RLS (RESTLESS LEGS SYNDROME): ICD-10-CM

## 2025-03-14 PROCEDURE — 99213 OFFICE O/P EST LOW 20 MIN: CPT | Performed by: FAMILY MEDICINE

## 2025-03-14 PROCEDURE — 3008F BODY MASS INDEX DOCD: CPT | Performed by: FAMILY MEDICINE

## 2025-03-14 PROCEDURE — 1036F TOBACCO NON-USER: CPT | Performed by: FAMILY MEDICINE

## 2025-03-14 RX ORDER — PREDNISONE 10 MG/1
TABLET ORAL
Qty: 70 TABLET | Refills: 0 | Status: SHIPPED | OUTPATIENT
Start: 2025-03-14

## 2025-03-14 RX ORDER — GABAPENTIN 300 MG/1
300 CAPSULE ORAL NIGHTLY
Qty: 90 CAPSULE | Refills: 1 | Status: SHIPPED | OUTPATIENT
Start: 2025-03-14 | End: 2025-09-10

## 2025-03-14 SDOH — ECONOMIC STABILITY: FOOD INSECURITY: WITHIN THE PAST 12 MONTHS, THE FOOD YOU BOUGHT JUST DIDN'T LAST AND YOU DIDN'T HAVE MONEY TO GET MORE.: NEVER TRUE

## 2025-03-14 SDOH — ECONOMIC STABILITY: FOOD INSECURITY: WITHIN THE PAST 12 MONTHS, YOU WORRIED THAT YOUR FOOD WOULD RUN OUT BEFORE YOU GOT MONEY TO BUY MORE.: NEVER TRUE

## 2025-03-14 ASSESSMENT — PATIENT HEALTH QUESTIONNAIRE - PHQ9
1. LITTLE INTEREST OR PLEASURE IN DOING THINGS: NOT AT ALL
2. FEELING DOWN, DEPRESSED OR HOPELESS: NOT AT ALL
SUM OF ALL RESPONSES TO PHQ9 QUESTIONS 1 & 2: 0

## 2025-03-14 ASSESSMENT — LIFESTYLE VARIABLES
SKIP TO QUESTIONS 9-10: 1
AUDIT-C TOTAL SCORE: 0
HOW OFTEN DO YOU HAVE A DRINK CONTAINING ALCOHOL: NEVER
HOW OFTEN DO YOU HAVE SIX OR MORE DRINKS ON ONE OCCASION: NEVER
HOW MANY STANDARD DRINKS CONTAINING ALCOHOL DO YOU HAVE ON A TYPICAL DAY: PATIENT DOES NOT DRINK

## 2025-03-14 ASSESSMENT — ENCOUNTER SYMPTOMS
LOSS OF SENSATION IN FEET: 0
DEPRESSION: 0
OCCASIONAL FEELINGS OF UNSTEADINESS: 0

## 2025-03-14 ASSESSMENT — PAIN SCALES - GENERAL: PAINLEVEL_OUTOF10: 2

## 2025-03-14 NOTE — ASSESSMENT & PLAN NOTE
Taking gabapentin at bedtime prn which helps  Orders:    gabapentin (Neurontin) 300 mg capsule; Take 1 capsule (300 mg) by mouth once daily at bedtime.

## 2025-03-14 NOTE — PROGRESS NOTES
Subjective   Patient ID: Kelsi Montejo is a 64 y.o. female who presents for Follow-up (Long haul covid ).  She is still having cognitive issues since having COVID. She has difficulty learning new information and gets overwhelmed if routines/processes at work changes. She has also noticed that she gets upset easily, irritable and angers easily also. She reports being mean at times. She is frustrated with her lack of improvement since covid but has been trying to change her mindset to deal with it as best as possible.     pain joint pain and fibromyalgia   She has a history of fibromyalgia and polyarthralgia. Her chronic bilateral arm pain (particularly around elbows) R>L. In the past, I have treated her with a long and slow steroid taper which provide relief for several weeks. In 2023 I prescribed Plaquenil. She was only able to take it for a few days due to it caused a wide spread itchy rash. It did seem to help with her chronic joint pain while she was on it. She is hesitant to see rheumatology again because she is scared to try new medication.      Snoring  Has improved and almost resolved since starting fluticazone and Zyrtec per pulm recommendations. She decided not to do the home sleep study that was ordered by pulm since the snoring improving so significantly. Previous dyspnea has also improved.           Patient Care Team:  Victoria Epstein DO as PCP - General  Daniel Vera MD as Consulting Physician (Hematology and Oncology)     Current Outpatient Medications   Medication Sig Dispense Refill    albuterol (ProAir HFA) 90 mcg/actuation inhaler Inhale 2 puffs every 4 hours if needed for wheezing, shortness of breath or other (coughing spells). 8.5 g 11    cetirizine (ZyrTEC) 10 mg tablet Take 1 tablet (10 mg) by mouth once daily. 30 tablet 11    ergocalciferol (Vitamin D-2) 1.25 MG (73158 UT) capsule Take 1 capsule (1,250 mcg) by mouth 1 (one) time per week. 12 capsule 3    famotidine (Pepcid) 40 mg  tablet Take 1 tablet (40 mg) by mouth once daily at bedtime. 30 tablet 5    fluticasone (Flonase) 50 mcg/actuation nasal spray Administer 2 sprays into each nostril once daily. Shake gently. Before first use, prime pump. After use, clean tip and replace cap. 16 g 6    levothyroxine (Synthroid, Levoxyl) 100 mcg tablet TAKE 1 TABLET BY MOUTH ONCE DAILY 90 tablet 3    zolpidem (Ambien) 10 mg tablet TAKE 1 TABLET BY MOUTH EVERY DAY AT BEDTIME AS NEEDED 90 tablet 0    gabapentin (Neurontin) 300 mg capsule Take 1 capsule (300 mg) by mouth once daily at bedtime. 90 capsule 1    predniSONE (Deltasone) 10 mg tablet Take 4 tablets (40 mg) by mouth once daily for 7 days, THEN 3 tablets (30 mg) once daily for 7 days, THEN 2 tablets (20 mg) once daily for 7 days, THEN 1 tablet (10 mg) once daily for 7 days. 70 tablet 0     No current facility-administered medications for this visit.       OARRS:  Victoria Epstein DO on 3/14/2025 11:04 AM  I have personally reviewed the OARRS report for Kelsi Montejo. I have considered the risks of abuse, dependence, addiction and diversion and I believe that it is clinically appropriate for Kelsi Montejo to be prescribed this medication    Is the patient prescribed a combination of a benzodiazepine and opioid?  No    Last Urine Drug Screen / ordered today: No  Recent Results (from the past 8760 hours)   Drug Screen, Urine With Reflex to Confirmation    Collection Time: 12/12/24 11:53 AM   Result Value Ref Range    Amphetamine Screen, Urine Presumptive Negative Presumptive Negative    Barbiturate Screen, Urine Presumptive Negative Presumptive Negative    Benzodiazepines Screen, Urine Presumptive Negative Presumptive Negative    Cannabinoid Screen, Urine Presumptive Negative Presumptive Negative    Cocaine Metabolite Screen, Urine Presumptive Negative Presumptive Negative    Fentanyl Screen, Urine Presumptive Negative Presumptive Negative    Opiate Screen, Urine Presumptive Negative  "Presumptive Negative    Oxycodone Screen, Urine Presumptive Negative Presumptive Negative    PCP Screen, Urine Presumptive Negative Presumptive Negative    Methadone Screen, Urine Presumptive Negative Presumptive Negative     Results are as expected.     Controlled Substance Agreement:  Date of the Last Agreement: 12/2024  Reviewed Controlled Substance Agreement including but not limited to the benefits, risks, and alternatives to treatment with a Controlled Substance medication(s).    Sleep Aids:   What is the patient's goal of therapy? Better sleep quantity/quality  Is this being achieved with current treatment? yes    Activities of Daily Living:   Is your overall impression that this patient is benefiting (symptom reduction outweighs side effects) from sleep aid therapy? Yes     Objective     Visit Vitals  /74 (BP Location: Left arm, Patient Position: Sitting, BP Cuff Size: Adult long)   Pulse 68   Temp 36 °C (96.8 °F) (Temporal)   Resp 20   Ht 1.676 m (5' 6\")   Wt 93.4 kg (206 lb)   SpO2 97%   BMI 33.25 kg/m²   Smoking Status Former   BSA 2.09 m²        Constitutional: Well nourished, well developed, appears stated age  Eyes: no scleral icterus, no conjunctival injection  Respiratory: normal respiratory effort  Musculoskeletal: No gross deformities appreciated  Skin: Warm, dry.  Neurologic: Alert, CNs II-XII grossly intact..  Psych: Appropriate mood and affect.        Assessment & Plan  RLS (restless legs syndrome)  Taking gabapentin at bedtime prn which helps  Orders:    gabapentin (Neurontin) 300 mg capsule; Take 1 capsule (300 mg) by mouth once daily at bedtime.    Pain in both upper extremities  Cervical spine Xray showed mild right neuroforaminal stenosis  at C4-C5  Orders:    predniSONE (Deltasone) 10 mg tablet; Take 4 tablets (40 mg) by mouth once daily for 7 days, THEN 3 tablets (30 mg) once daily for 7 days, THEN 2 tablets (20 mg) once daily for 7 days, THEN 1 tablet (10 mg) once daily for 7 " days.    Cervical radiculopathy at C5    Orders:    predniSONE (Deltasone) 10 mg tablet; Take 4 tablets (40 mg) by mouth once daily for 7 days, THEN 3 tablets (30 mg) once daily for 7 days, THEN 2 tablets (20 mg) once daily for 7 days, THEN 1 tablet (10 mg) once daily for 7 days.    COVID-19 long hauler manifesting chronic concentration deficit  Failed LDN due to nausea         Chronic insomnia  CSA and UDS on file 12/2024  Continue Zolpidem                Follow up 6 months.     Victoria Epstein, DO

## 2025-03-14 NOTE — ASSESSMENT & PLAN NOTE
Cervical spine Xray showed mild right neuroforaminal stenosis  at C4-C5  Orders:    predniSONE (Deltasone) 10 mg tablet; Take 4 tablets (40 mg) by mouth once daily for 7 days, THEN 3 tablets (30 mg) once daily for 7 days, THEN 2 tablets (20 mg) once daily for 7 days, THEN 1 tablet (10 mg) once daily for 7 days.

## 2025-03-21 ENCOUNTER — OFFICE VISIT (OUTPATIENT)
Dept: PULMONOLOGY | Facility: HOSPITAL | Age: 65
End: 2025-03-21
Payer: COMMERCIAL

## 2025-03-21 VITALS
TEMPERATURE: 97.3 F | HEIGHT: 66 IN | HEART RATE: 68 BPM | WEIGHT: 206 LBS | RESPIRATION RATE: 16 BRPM | SYSTOLIC BLOOD PRESSURE: 135 MMHG | BODY MASS INDEX: 33.11 KG/M2 | DIASTOLIC BLOOD PRESSURE: 87 MMHG | OXYGEN SATURATION: 95 %

## 2025-03-21 DIAGNOSIS — J30.89 PERENNIAL ALLERGIC RHINITIS WITH SEASONAL VARIATION: ICD-10-CM

## 2025-03-21 DIAGNOSIS — J30.2 PERENNIAL ALLERGIC RHINITIS WITH SEASONAL VARIATION: ICD-10-CM

## 2025-03-21 DIAGNOSIS — K21.9 GASTROESOPHAGEAL REFLUX DISEASE WITHOUT ESOPHAGITIS: ICD-10-CM

## 2025-03-21 DIAGNOSIS — R06.02 SHORTNESS OF BREATH: Primary | ICD-10-CM

## 2025-03-21 PROCEDURE — 1036F TOBACCO NON-USER: CPT | Performed by: INTERNAL MEDICINE

## 2025-03-21 PROCEDURE — 99213 OFFICE O/P EST LOW 20 MIN: CPT | Performed by: INTERNAL MEDICINE

## 2025-03-21 PROCEDURE — 3008F BODY MASS INDEX DOCD: CPT | Performed by: INTERNAL MEDICINE

## 2025-03-21 NOTE — PATIENT INSTRUCTIONS
Take albuterol inhaler 2 puffs as needed for shortness of breath. May take every 4hrs as needed for shortness of breath.   Continue taking cetirizine 1 tablet daily in the evening.  Continue fluticasone nasal spray in each nostril at bedtime.  Take famotidine 1 tablet daily at bedtime. Avoid spicy food, excessive caffeine or alcoholic beverages.  Follow up in January 2026.

## 2025-03-21 NOTE — PROGRESS NOTES
Subjective   Patient ID: Kelsi Montejo is a 64 y.o. female who presents for Shortness of Breath.    Shortness of Breath    Ms. Montejo is a 63 yo female was initially referred for evaluation for shortness of breath and discuss recent CT chest showing bibasilar mild scarring. Patient states she has had recurrent bronchitis and pneumonia as a child. She needed emergency trach at age of 5 but did not find any specific pathology in her airway or lungs. She has been very anxious about her breathing issues especially since she had Covid in June 2022. She reports she has been told to have long covid and she has noted memory issues with that. She does note being emotional lately since her symptoms have recurred recently. She was hospitalized with ARDS Severe pneumonia due to Covid and Pneumococcal at [facility] in June 2022 [place] [facility] [place] gradually and has not had consistent issues with SOB with activity but gets sinus issues frequently. She is not on home O2. She went to ER Nov 17th for SOB and hypoxia. She states that she was having SOB for 7 to 10 days prior to coming to the ER. She states that her POX was running in 80s. She states she kept feeling that she was having sinus infection. She will feel nasal congestion 'cloggy' and had postnasal drip. She got antibiotics for 2 weeks which helped. She still feels that she has shortness of breath with bending over or doing daily household chores. 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 She states that when she laughs or cries she gets pains in the right flank and epigastric area.  She notes having GERD but stopped PPI but takes Tums. She also notes significant fatigue and excessive daytime sleepiness. She wakes up choking and snorting, unrefreshed and  has noted snoring. She started smoking at age of 16 then quit for 10 years but smoked consistently approx 32 years x 1 ppd.     She is here for follow up along with her   today. She states her symptoms are significant better now. She does not have SOB, coughing spells, wheezing, chest tightness. She is taking cetirizine daily and prn flonase. She is also taking famotidine nightly. She is avoiding exposure to her granddaughter's cat as much as she can. Her  states patient's snoring is improved now and pt denies any EDS or fatigue currently.     Review of Systems   All other systems reviewed and are negative.      Objective   Physical Exam  Vitals and nursing note reviewed.   Constitutional:       Appearance: Normal appearance.   HENT:      Head: Normocephalic.      Nose: Nose normal.      Mouth/Throat:      Pharynx: Oropharynx is clear.   Eyes:      Extraocular Movements: Extraocular movements intact.      Conjunctiva/sclera: Conjunctivae normal.      Pupils: Pupils are equal, round, and reactive to light.   Cardiovascular:      Rate and Rhythm: Normal rate and regular rhythm.      Pulses: Normal pulses.      Heart sounds: Normal heart sounds.   Pulmonary:      Effort: Pulmonary effort is normal.      Breath sounds: Normal breath sounds.   Abdominal:      General: Bowel sounds are normal.      Palpations: Abdomen is soft.   Musculoskeletal:         General: Normal range of motion.      Cervical back: Normal range of motion.   Skin:     General: Skin is warm.   Neurological:      General: No focal deficit present.      Mental Status: She is alert and oriented to person, place, and time. Mental status is at baseline.   Psychiatric:         Mood and Affect: Mood normal.         Behavior: Behavior normal.         Assessment/Plan   Shortness of breath  Hypersomnia with sleep apnea  Chronic allergic rhintiis  GERD  Hx of tracheostomy at age of 5  Rt sided MSK pain resolved    Recommendations:  Patient provides history of recurrent bronchitis in childhood and uncontrolled recurrent allergic rhinitis symptoms that raise concern for possible underlying Asthma. She has also  smoked about 32 years x 1 ppd which raises concern for COPD. Currently she does not have symptoms of chronic bronchitis and her CT chest did not show obvious emphysema. However, on my personal review I did notice some changes of bronchitis which could be related to possible ACOS versus postCovid especially when you include mild scarring residual on CT chest. There is no significant finding otherwise on CT chest reported such as lung nodule. Provided reassurance to patient and discussed to evaluate further from ACOS standpoint. In the meanwhile also recommend work up for Sleep Disordered Breathing.     -Discussed PFTs: Essentially normal. Interpretation stated restriction but TLC is normal.  -take albuterol inhaler prn for shortness of breath  -RAST + for cat dander  -Does notice significant improvement with cetirizine and flonase  -GERD rx with famotidine HS and PPI prn. Monitor renal functions. Avoid spicy foods and caffeine.  -Topical analgesic for MSK pain. States biofreeze helps.  -Recommend HST asap. High probability of DARSHANA. Large neck, BMI 33.4, Upper airway narrowing with Mallampati class IV, significant symptoms to suggest SDB, DEONG 6. She states on follow up she does not feel EDS, fatigue and  states her snoring is resolved now. She would like to defer HST unless her symptoms recur. Discussed treatment options at length including discussion for weight control, side-lying position and treatment for nasal allergies/obstruction. Counseled regarding implications of untreated DARSHANA including cardiovascular, metabolic, neurologic morbidity and mortality.    Continue Rx with cetirizine, fluticasone nasal spray, famotidine HS. Advised to report if SOB recurs. Advised to complete HST if symptoms recur but continue with efforts to lose weight in the meanwhile and avoid supine position as possible. Avoid exposure to cat dander. Follow up yearly or sooner if symptoms recur.

## 2025-04-09 ENCOUNTER — APPOINTMENT (OUTPATIENT)
Dept: PULMONOLOGY | Facility: HOSPITAL | Age: 65
End: 2025-04-09
Payer: COMMERCIAL

## 2025-06-02 DIAGNOSIS — M54.12 CERVICAL RADICULOPATHY AT C5: ICD-10-CM

## 2025-06-02 DIAGNOSIS — E55.9 VITAMIN D DEFICIENCY: ICD-10-CM

## 2025-06-02 DIAGNOSIS — M79.601 PAIN IN BOTH UPPER EXTREMITIES: ICD-10-CM

## 2025-06-02 DIAGNOSIS — M79.602 PAIN IN BOTH UPPER EXTREMITIES: ICD-10-CM

## 2025-06-02 RX ORDER — PREDNISONE 10 MG/1
TABLET ORAL
Qty: 70 TABLET | Refills: 3 | Status: SHIPPED | OUTPATIENT
Start: 2025-06-02

## 2025-06-02 RX ORDER — ERGOCALCIFEROL 1.25 MG/1
1.25 CAPSULE ORAL
Qty: 12 CAPSULE | Refills: 3 | Status: SHIPPED | OUTPATIENT
Start: 2025-06-02

## 2025-06-03 DIAGNOSIS — F51.04 CHRONIC INSOMNIA: Chronic | ICD-10-CM

## 2025-06-03 RX ORDER — ZOLPIDEM TARTRATE 10 MG/1
10 TABLET ORAL NIGHTLY PRN
Qty: 90 TABLET | Refills: 0 | Status: SHIPPED | OUTPATIENT
Start: 2025-06-03

## 2025-06-09 DIAGNOSIS — J30.89 PERENNIAL ALLERGIC RHINITIS WITH SEASONAL VARIATION: ICD-10-CM

## 2025-06-09 DIAGNOSIS — J30.2 PERENNIAL ALLERGIC RHINITIS WITH SEASONAL VARIATION: ICD-10-CM

## 2025-06-09 RX ORDER — FLUTICASONE PROPIONATE 50 MCG
2 SPRAY, SUSPENSION (ML) NASAL DAILY
Qty: 16 G | Refills: 5 | Status: SHIPPED | OUTPATIENT
Start: 2025-06-09 | End: 2025-12-06

## 2025-06-12 ENCOUNTER — APPOINTMENT (OUTPATIENT)
Dept: PRIMARY CARE | Facility: CLINIC | Age: 65
End: 2025-06-12
Payer: COMMERCIAL

## 2025-07-08 DIAGNOSIS — Z12.31 ENCOUNTER FOR SCREENING MAMMOGRAM FOR BREAST CANCER: ICD-10-CM

## 2025-09-18 ENCOUNTER — APPOINTMENT (OUTPATIENT)
Dept: PRIMARY CARE | Facility: CLINIC | Age: 65
End: 2025-09-18
Payer: COMMERCIAL

## 2026-01-09 ENCOUNTER — APPOINTMENT (OUTPATIENT)
Dept: PRIMARY CARE | Facility: CLINIC | Age: 66
End: 2026-01-09
Payer: COMMERCIAL